# Patient Record
Sex: FEMALE | Race: WHITE | NOT HISPANIC OR LATINO | Employment: PART TIME | URBAN - METROPOLITAN AREA
[De-identification: names, ages, dates, MRNs, and addresses within clinical notes are randomized per-mention and may not be internally consistent; named-entity substitution may affect disease eponyms.]

---

## 2020-07-22 ENCOUNTER — HOSPITAL ENCOUNTER (EMERGENCY)
Facility: HOSPITAL | Age: 40
Discharge: HOME | End: 2020-07-22
Attending: EMERGENCY MEDICINE
Payer: MEDICAID

## 2020-07-22 VITALS
SYSTOLIC BLOOD PRESSURE: 135 MMHG | DIASTOLIC BLOOD PRESSURE: 72 MMHG | HEART RATE: 70 BPM | OXYGEN SATURATION: 100 % | HEIGHT: 64 IN | TEMPERATURE: 99 F | WEIGHT: 195 LBS | BODY MASS INDEX: 33.29 KG/M2 | RESPIRATION RATE: 18 BRPM

## 2020-07-22 DIAGNOSIS — R60.9 SWELLING: ICD-10-CM

## 2020-07-22 DIAGNOSIS — M79.89 LEG SWELLING: Primary | ICD-10-CM

## 2020-07-22 DIAGNOSIS — R60.0 EXTREMITY EDEMA: ICD-10-CM

## 2020-07-22 LAB
ALBUMIN SERPL BCP-MCNC: 3.6 G/DL (ref 3.5–5.2)
ALBUMIN SERPL BCP-MCNC: 3.8 G/DL (ref 3.5–5.2)
ALP SERPL-CCNC: 76 U/L (ref 55–135)
ALP SERPL-CCNC: 77 U/L (ref 55–135)
ALT SERPL W/O P-5'-P-CCNC: 97 U/L (ref 10–44)
ALT SERPL W/O P-5'-P-CCNC: 98 U/L (ref 10–44)
ANION GAP SERPL CALC-SCNC: 8 MMOL/L (ref 8–16)
AST SERPL-CCNC: 92 U/L (ref 10–40)
AST SERPL-CCNC: 96 U/L (ref 10–40)
B-HCG UR QL: NEGATIVE
BASOPHILS # BLD AUTO: 0.04 K/UL (ref 0–0.2)
BASOPHILS NFR BLD: 0.6 % (ref 0–1.9)
BILIRUB DIRECT SERPL-MCNC: 0.3 MG/DL (ref 0.1–0.3)
BILIRUB SERPL-MCNC: 0.7 MG/DL (ref 0.1–1)
BILIRUB SERPL-MCNC: 0.8 MG/DL (ref 0.1–1)
BILIRUB UR QL STRIP: NEGATIVE
BNP SERPL-MCNC: 166 PG/ML (ref 0–99)
BUN SERPL-MCNC: 16 MG/DL (ref 6–20)
CALCIUM SERPL-MCNC: 8.7 MG/DL (ref 8.7–10.5)
CHLORIDE SERPL-SCNC: 103 MMOL/L (ref 95–110)
CK SERPL-CCNC: 55 U/L (ref 20–180)
CLARITY UR: CLEAR
CO2 SERPL-SCNC: 27 MMOL/L (ref 23–29)
COLOR UR: YELLOW
CREAT SERPL-MCNC: 0.6 MG/DL (ref 0.5–1.4)
CTP QC/QA: YES
DIFFERENTIAL METHOD: ABNORMAL
EOSINOPHIL # BLD AUTO: 0.2 K/UL (ref 0–0.5)
EOSINOPHIL NFR BLD: 3.3 % (ref 0–8)
ERYTHROCYTE [DISTWIDTH] IN BLOOD BY AUTOMATED COUNT: 11.8 % (ref 11.5–14.5)
EST. GFR  (AFRICAN AMERICAN): >60 ML/MIN/1.73 M^2
EST. GFR  (NON AFRICAN AMERICAN): >60 ML/MIN/1.73 M^2
GLUCOSE SERPL-MCNC: 99 MG/DL (ref 70–110)
GLUCOSE UR QL STRIP: NEGATIVE
HCT VFR BLD AUTO: 45 % (ref 37–48.5)
HGB BLD-MCNC: 15.5 G/DL (ref 12–16)
HGB UR QL STRIP: NEGATIVE
IMM GRANULOCYTES # BLD AUTO: 0.01 K/UL (ref 0–0.04)
IMM GRANULOCYTES NFR BLD AUTO: 0.1 % (ref 0–0.5)
INR PPP: 1.1
KETONES UR QL STRIP: NEGATIVE
LEUKOCYTE ESTERASE UR QL STRIP: NEGATIVE
LYMPHOCYTES # BLD AUTO: 3.3 K/UL (ref 1–4.8)
LYMPHOCYTES NFR BLD: 49.8 % (ref 18–48)
MCH RBC QN AUTO: 33 PG (ref 27–31)
MCHC RBC AUTO-ENTMCNC: 34.4 G/DL (ref 32–36)
MCV RBC AUTO: 96 FL (ref 82–98)
MONOCYTES # BLD AUTO: 0.4 K/UL (ref 0.3–1)
MONOCYTES NFR BLD: 6.4 % (ref 4–15)
NEUTROPHILS # BLD AUTO: 2.7 K/UL (ref 1.8–7.7)
NEUTROPHILS NFR BLD: 39.8 % (ref 38–73)
NITRITE UR QL STRIP: NEGATIVE
NRBC BLD-RTO: 0 /100 WBC
PH UR STRIP: 6 [PH] (ref 5–8)
PLATELET # BLD AUTO: 130 K/UL (ref 150–350)
PMV BLD AUTO: 10.6 FL (ref 9.2–12.9)
POTASSIUM SERPL-SCNC: 3.9 MMOL/L (ref 3.5–5.1)
PROT SERPL-MCNC: 7.1 G/DL (ref 6–8.4)
PROT SERPL-MCNC: 7.4 G/DL (ref 6–8.4)
PROT UR QL STRIP: ABNORMAL
PROTHROMBIN TIME: 13.9 SEC (ref 10.6–14.8)
RBC # BLD AUTO: 4.69 M/UL (ref 4–5.4)
SODIUM SERPL-SCNC: 138 MMOL/L (ref 136–145)
SP GR UR STRIP: 1.03 (ref 1–1.03)
TSH SERPL DL<=0.005 MIU/L-ACNC: 1.84 UIU/ML (ref 0.34–5.6)
URN SPEC COLLECT METH UR: ABNORMAL
UROBILINOGEN UR STRIP-ACNC: ABNORMAL EU/DL
WBC # BLD AUTO: 6.67 K/UL (ref 3.9–12.7)

## 2020-07-22 PROCEDURE — 93005 ELECTROCARDIOGRAM TRACING: CPT | Performed by: INTERNAL MEDICINE

## 2020-07-22 PROCEDURE — 81025 URINE PREGNANCY TEST: CPT | Performed by: NURSE PRACTITIONER

## 2020-07-22 PROCEDURE — 36415 COLL VENOUS BLD VENIPUNCTURE: CPT

## 2020-07-22 PROCEDURE — 83880 ASSAY OF NATRIURETIC PEPTIDE: CPT

## 2020-07-22 PROCEDURE — 80076 HEPATIC FUNCTION PANEL: CPT

## 2020-07-22 PROCEDURE — 84443 ASSAY THYROID STIM HORMONE: CPT

## 2020-07-22 PROCEDURE — 85025 COMPLETE CBC W/AUTO DIFF WBC: CPT

## 2020-07-22 PROCEDURE — 81003 URINALYSIS AUTO W/O SCOPE: CPT

## 2020-07-22 PROCEDURE — 85610 PROTHROMBIN TIME: CPT

## 2020-07-22 PROCEDURE — 82550 ASSAY OF CK (CPK): CPT

## 2020-07-22 PROCEDURE — 25000003 PHARM REV CODE 250: Performed by: EMERGENCY MEDICINE

## 2020-07-22 PROCEDURE — 80053 COMPREHEN METABOLIC PANEL: CPT

## 2020-07-22 PROCEDURE — 99285 EMERGENCY DEPT VISIT HI MDM: CPT | Mod: 25

## 2020-07-22 RX ORDER — FUROSEMIDE 10 MG/ML
20 INJECTION INTRAMUSCULAR; INTRAVENOUS
Status: DISCONTINUED | OUTPATIENT
Start: 2020-07-22 | End: 2020-07-22

## 2020-07-22 RX ORDER — FUROSEMIDE 20 MG/1
20 TABLET ORAL
Status: COMPLETED | OUTPATIENT
Start: 2020-07-22 | End: 2020-07-22

## 2020-07-22 RX ADMIN — FUROSEMIDE 20 MG: 40 TABLET ORAL at 07:07

## 2020-07-22 NOTE — DISCHARGE INSTRUCTIONS
Keep appointment with your primary care physician as scheduled.  You can obtain results from your thyroid function test then.  You also need further evaluation of elevated liver function test.

## 2020-07-22 NOTE — ED PROVIDER NOTES
Encounter Date: 7/22/2020       History     Chief Complaint   Patient presents with    Leg Swelling     Bilateral leg swelling for 4 days, denies chest pain or shortness of breath     Patient recently went on a long car trip back and forth to [place].  Since that time she has been having bilateral lower extremity swelling.  She has no chest pain shortness of breath.  No similar swelling in the past.  Elevation only partially relieve symptoms.  No recent illness.  No fever or chills.        Review of patient's allergies indicates:  No Known Allergies  No past medical history on file.  Past Surgical History:   Procedure Laterality Date    ABDOMINAL SURGERY       No family history on file.  Social History     Tobacco Use    Smoking status: Never Smoker   Substance Use Topics    Alcohol use: No    Drug use: No     Review of Systems   Constitutional: Negative for chills and fever.   HENT: Negative for congestion.    Eyes: Negative for visual disturbance.   Respiratory: Negative for shortness of breath.    Cardiovascular: Negative for chest pain and palpitations.   Gastrointestinal: Negative for abdominal pain and vomiting.   Genitourinary: Negative for dysuria.   Musculoskeletal: Negative for joint swelling.   Neurological: Negative for headaches.   Psychiatric/Behavioral: Negative for confusion.       Physical Exam     Initial Vitals [07/22/20 1345]   BP Pulse Resp Temp SpO2   (!) 141/60 71 18 98.7 °F (37.1 °C) 100 %      MAP       --         Physical Exam    Nursing note and vitals reviewed.  Constitutional: She is not diaphoretic. No distress.   HENT:   Head: Normocephalic and atraumatic.   Eyes: Conjunctivae are normal.   Neck: Normal range of motion.   Cardiovascular: Normal rate.   Pulmonary/Chest: Breath sounds normal.   Abdominal: Soft. There is no abdominal tenderness.   Musculoskeletal: Normal range of motion.      Comments: There is 1+ bilateral pretibial edema.  Both legs with good dorsalis pedis  pulses.  Capillary refill less than 2 sec.  Full range of motion both legs with no pain.   Neurological: She is alert.   No gross deficits   Skin: No rash noted.   Psychiatric: She has a normal mood and affect.         ED Course   Procedures  Labs Reviewed   CBC W/ AUTO DIFFERENTIAL - Abnormal; Notable for the following components:       Result Value    Mean Corpuscular Hemoglobin 33.0 (*)     Platelets 130 (*)     Lymph% 49.8 (*)     All other components within normal limits   COMPREHENSIVE METABOLIC PANEL - Abnormal; Notable for the following components:    AST 92 (*)     ALT 97 (*)     All other components within normal limits   URINALYSIS - Abnormal; Notable for the following components:    Protein, UA Trace (*)     Urobilinogen, UA 2.0-3.0 (*)     All other components within normal limits   B-TYPE NATRIURETIC PEPTIDE - Abnormal; Notable for the following components:     (*)     All other components within normal limits   HEPATIC FUNCTION PANEL - Abnormal; Notable for the following components:    AST 96 (*)     ALT 98 (*)     All other components within normal limits   CK   HEPATIC FUNCTION PANEL   PROTIME-INR   POCT URINE PREGNANCY          Imaging Results          US Lower Extremity Veins Bilateral (In process)                X-Ray Chest PA And Lateral (Final result)  Result time 07/22/20 18:00:09    Final result by Richi Moraes MD (07/22/20 18:00:09)                 Narrative:    HISTORY: Dyspnea,  bilateral lower extremity swelling.    FINDINGS: PA and lateral chest radiograph at 1746 hours with no prior  studies for comparison show the trachea is midline, with the  cardiomediastinal silhouette and pulmonary vascular distribution  within normal limits.    The lungs are normally and symmetrically expanded, with no  consolidation, pleural effusion or evidence of pulmonary edema. No  confluent infiltrates or pneumothorax. There are no significant  osseous abnormalities.    IMPRESSION: No evidence of  active cardiopulmonary disease.    Electronically Signed by Richi KELLEY on 7/22/2020 6:07 PM                               Medical Decision Making:   History:   Old Medical Records: I decided to obtain old medical records.  Clinical Tests:   Lab Tests: Reviewed  Radiological Study: Reviewed  Medical Tests: Reviewed  ED Management:  Patient presents with lower extremity edema after their car trip.  No evidence of venous thrombosis.  Patient does have depended edema.  Will give 1 dose of Lasix.  Elevation stressed patient.  Patient has some minor elevation in AST and ALT.  She is to follow with her primary care doctor for further evaluation of liver function test hand to obtain thyroid testing results.                   ED Course as of Jul 22 1837   Wed Jul 22, 2020   1734 Here with c/o leg swelling denies SOB, bilateral leg pain , no fever     [MP]      ED Course User Index  [MP] ARSEN Smith                Clinical Impression:       ICD-10-CM ICD-9-CM   1. Leg swelling  M79.89 729.81   2. Swelling  R60.9 782.3   3. Extremity edema  R60.0 782.3                                Arpan Toro MD  07/22/20 9536

## 2021-02-25 ENCOUNTER — OFFICE VISIT (OUTPATIENT)
Dept: PODIATRY | Facility: CLINIC | Age: 41
End: 2021-02-25
Payer: MEDICAID

## 2021-02-25 VITALS — OXYGEN SATURATION: 97 % | BODY MASS INDEX: 33.47 KG/M2 | HEIGHT: 64 IN | HEART RATE: 94 BPM

## 2021-02-25 DIAGNOSIS — M79.675 PAIN IN TOES OF BOTH FEET: ICD-10-CM

## 2021-02-25 DIAGNOSIS — M79.674 PAIN IN TOES OF BOTH FEET: ICD-10-CM

## 2021-02-25 DIAGNOSIS — L60.0 INGROWN NAIL: Primary | ICD-10-CM

## 2021-02-25 PROCEDURE — 99203 OFFICE O/P NEW LOW 30 MIN: CPT | Mod: 25,S$GLB,, | Performed by: PODIATRIST

## 2021-02-25 PROCEDURE — 99203 PR OFFICE/OUTPT VISIT, NEW, LEVL III, 30-44 MIN: ICD-10-PCS | Mod: 25,S$GLB,, | Performed by: PODIATRIST

## 2021-02-25 PROCEDURE — 11750 EXCISION NAIL&NAIL MATRIX: CPT | Mod: T5,S$GLB,, | Performed by: PODIATRIST

## 2021-02-25 PROCEDURE — 11750 NAIL REMOVAL: ICD-10-PCS | Mod: 51,TA,S$GLB, | Performed by: PODIATRIST

## 2021-02-25 RX ORDER — BUPRENORPHINE HYDROCHLORIDE, NALOXONE HYDROCHLORIDE 8; 2 MG/1; MG/1
FILM, SOLUBLE BUCCAL; SUBLINGUAL DAILY
COMMUNITY
Start: 2021-02-03

## 2021-02-25 RX ORDER — CEPHALEXIN 500 MG/1
500 CAPSULE ORAL EVERY 12 HOURS
Qty: 10 CAPSULE | Refills: 0 | Status: SHIPPED | OUTPATIENT
Start: 2021-02-25 | End: 2021-03-02

## 2021-03-10 ENCOUNTER — OFFICE VISIT (OUTPATIENT)
Dept: PODIATRY | Facility: CLINIC | Age: 41
End: 2021-03-10
Payer: MEDICAID

## 2021-03-10 VITALS — HEIGHT: 64 IN | BODY MASS INDEX: 33.29 KG/M2 | WEIGHT: 195 LBS

## 2021-03-10 DIAGNOSIS — M79.674 PAIN IN TOES OF BOTH FEET: ICD-10-CM

## 2021-03-10 DIAGNOSIS — M79.675 PAIN IN TOES OF BOTH FEET: ICD-10-CM

## 2021-03-10 DIAGNOSIS — L60.0 INGROWN NAIL: Primary | ICD-10-CM

## 2021-03-10 PROCEDURE — 99213 PR OFFICE/OUTPT VISIT, EST, LEVL III, 20-29 MIN: ICD-10-PCS | Mod: S$GLB,,, | Performed by: PODIATRIST

## 2021-03-10 PROCEDURE — 99213 OFFICE O/P EST LOW 20 MIN: CPT | Mod: S$GLB,,, | Performed by: PODIATRIST

## 2021-06-13 ENCOUNTER — HOSPITAL ENCOUNTER (EMERGENCY)
Facility: HOSPITAL | Age: 41
Discharge: HOME | End: 2021-06-13
Attending: EMERGENCY MEDICINE
Payer: MEDICAID

## 2021-06-13 VITALS
HEIGHT: 64 IN | OXYGEN SATURATION: 99 % | BODY MASS INDEX: 34.15 KG/M2 | SYSTOLIC BLOOD PRESSURE: 142 MMHG | TEMPERATURE: 98 F | WEIGHT: 200 LBS | RESPIRATION RATE: 18 BRPM | DIASTOLIC BLOOD PRESSURE: 78 MMHG | HEART RATE: 90 BPM

## 2021-06-13 DIAGNOSIS — G89.18 POSTOPERATIVE PAIN: Primary | ICD-10-CM

## 2021-06-13 PROCEDURE — 99281 EMR DPT VST MAYX REQ PHY/QHP: CPT

## 2021-09-09 DIAGNOSIS — M72.2 PLANTAR FASCIAL FIBROMATOSIS: Primary | ICD-10-CM

## 2021-09-13 ENCOUNTER — HOSPITAL ENCOUNTER (OUTPATIENT)
Dept: RADIOLOGY | Facility: HOSPITAL | Age: 41
Discharge: HOME | End: 2021-09-13
Attending: STUDENT IN AN ORGANIZED HEALTH CARE EDUCATION/TRAINING PROGRAM
Payer: MEDICAID

## 2021-09-13 DIAGNOSIS — M72.2 PLANTAR FASCIAL FIBROMATOSIS: ICD-10-CM

## 2021-09-13 PROCEDURE — 73630 X-RAY EXAM OF FOOT: CPT | Mod: TC,50,PO

## 2021-09-16 ENCOUNTER — HOSPITAL ENCOUNTER (OUTPATIENT)
Dept: RADIOLOGY | Facility: HOSPITAL | Age: 41
Discharge: HOME | End: 2021-09-16
Attending: STUDENT IN AN ORGANIZED HEALTH CARE EDUCATION/TRAINING PROGRAM
Payer: MEDICAID

## 2021-09-16 DIAGNOSIS — R22.40 LOCALIZED SWELLING, MASS AND LUMP, LOWER LIMB: ICD-10-CM

## 2021-09-16 PROCEDURE — 93970 EXTREMITY STUDY: CPT | Mod: TC

## 2021-09-20 DIAGNOSIS — M77.31 BILATERAL CALCANEAL SPURS: Primary | ICD-10-CM

## 2021-09-20 DIAGNOSIS — M77.32 BILATERAL CALCANEAL SPURS: Primary | ICD-10-CM

## 2021-09-23 ENCOUNTER — CLINICAL SUPPORT (OUTPATIENT)
Dept: REHABILITATION | Facility: HOSPITAL | Age: 41
End: 2021-09-23
Payer: MEDICAID

## 2021-09-23 DIAGNOSIS — R29.898 DECREASED STRENGTH OF LOWER EXTREMITY: ICD-10-CM

## 2021-09-23 DIAGNOSIS — G89.29 CHRONIC FOOT PAIN, UNSPECIFIED LATERALITY: ICD-10-CM

## 2021-09-23 DIAGNOSIS — M25.672 DECREASED RANGE OF MOTION OF BOTH ANKLES: ICD-10-CM

## 2021-09-23 DIAGNOSIS — M25.671 DECREASED RANGE OF MOTION OF BOTH ANKLES: ICD-10-CM

## 2021-09-23 DIAGNOSIS — M79.673 CHRONIC FOOT PAIN, UNSPECIFIED LATERALITY: ICD-10-CM

## 2021-09-23 PROCEDURE — 97161 PT EVAL LOW COMPLEX 20 MIN: CPT | Mod: PN

## 2021-09-28 ENCOUNTER — CLINICAL SUPPORT (OUTPATIENT)
Dept: REHABILITATION | Facility: HOSPITAL | Age: 41
End: 2021-09-28
Payer: MEDICAID

## 2021-09-28 DIAGNOSIS — M79.673 CHRONIC FOOT PAIN, UNSPECIFIED LATERALITY: ICD-10-CM

## 2021-09-28 DIAGNOSIS — G89.29 CHRONIC FOOT PAIN, UNSPECIFIED LATERALITY: ICD-10-CM

## 2021-09-28 DIAGNOSIS — M25.671 DECREASED RANGE OF MOTION OF BOTH ANKLES: ICD-10-CM

## 2021-09-28 DIAGNOSIS — R29.898 DECREASED STRENGTH OF LOWER EXTREMITY: ICD-10-CM

## 2021-09-28 DIAGNOSIS — M25.672 DECREASED RANGE OF MOTION OF BOTH ANKLES: ICD-10-CM

## 2021-09-28 PROCEDURE — 97110 THERAPEUTIC EXERCISES: CPT | Mod: PN,CQ

## 2021-10-04 ENCOUNTER — CLINICAL SUPPORT (OUTPATIENT)
Dept: REHABILITATION | Facility: HOSPITAL | Age: 41
End: 2021-10-04
Payer: MEDICAID

## 2021-10-04 DIAGNOSIS — R29.898 DECREASED STRENGTH OF LOWER EXTREMITY: ICD-10-CM

## 2021-10-04 DIAGNOSIS — M79.673 CHRONIC FOOT PAIN, UNSPECIFIED LATERALITY: ICD-10-CM

## 2021-10-04 DIAGNOSIS — M25.672 DECREASED RANGE OF MOTION OF BOTH ANKLES: ICD-10-CM

## 2021-10-04 DIAGNOSIS — G89.29 CHRONIC FOOT PAIN, UNSPECIFIED LATERALITY: ICD-10-CM

## 2021-10-04 DIAGNOSIS — M25.671 DECREASED RANGE OF MOTION OF BOTH ANKLES: ICD-10-CM

## 2021-10-04 PROCEDURE — 97110 THERAPEUTIC EXERCISES: CPT | Mod: PN

## 2021-10-07 ENCOUNTER — DOCUMENTATION ONLY (OUTPATIENT)
Dept: REHABILITATION | Facility: HOSPITAL | Age: 41
End: 2021-10-07

## 2021-10-08 ENCOUNTER — CLINICAL SUPPORT (OUTPATIENT)
Dept: REHABILITATION | Facility: HOSPITAL | Age: 41
End: 2021-10-08
Payer: MEDICAID

## 2021-10-08 DIAGNOSIS — M79.673 CHRONIC FOOT PAIN, UNSPECIFIED LATERALITY: ICD-10-CM

## 2021-10-08 DIAGNOSIS — M25.671 DECREASED RANGE OF MOTION OF BOTH ANKLES: ICD-10-CM

## 2021-10-08 DIAGNOSIS — M25.672 DECREASED RANGE OF MOTION OF BOTH ANKLES: ICD-10-CM

## 2021-10-08 DIAGNOSIS — G89.29 CHRONIC FOOT PAIN, UNSPECIFIED LATERALITY: ICD-10-CM

## 2021-10-08 DIAGNOSIS — R29.898 DECREASED STRENGTH OF LOWER EXTREMITY: ICD-10-CM

## 2021-10-08 PROCEDURE — 97110 THERAPEUTIC EXERCISES: CPT | Mod: PN,CQ

## 2021-10-12 ENCOUNTER — CLINICAL SUPPORT (OUTPATIENT)
Dept: REHABILITATION | Facility: HOSPITAL | Age: 41
End: 2021-10-12
Payer: MEDICAID

## 2021-10-12 DIAGNOSIS — G89.29 CHRONIC FOOT PAIN, UNSPECIFIED LATERALITY: ICD-10-CM

## 2021-10-12 DIAGNOSIS — R29.898 DECREASED STRENGTH OF LOWER EXTREMITY: ICD-10-CM

## 2021-10-12 DIAGNOSIS — M25.672 DECREASED RANGE OF MOTION OF BOTH ANKLES: ICD-10-CM

## 2021-10-12 DIAGNOSIS — M79.673 CHRONIC FOOT PAIN, UNSPECIFIED LATERALITY: ICD-10-CM

## 2021-10-12 DIAGNOSIS — M25.671 DECREASED RANGE OF MOTION OF BOTH ANKLES: ICD-10-CM

## 2021-10-12 PROCEDURE — 97110 THERAPEUTIC EXERCISES: CPT | Mod: PN,CQ

## 2021-10-22 ENCOUNTER — CLINICAL SUPPORT (OUTPATIENT)
Dept: REHABILITATION | Facility: HOSPITAL | Age: 41
End: 2021-10-22
Payer: MEDICAID

## 2021-10-22 DIAGNOSIS — M25.672 DECREASED RANGE OF MOTION OF BOTH ANKLES: ICD-10-CM

## 2021-10-22 DIAGNOSIS — R29.898 DECREASED STRENGTH OF LOWER EXTREMITY: ICD-10-CM

## 2021-10-22 DIAGNOSIS — G89.29 CHRONIC FOOT PAIN, UNSPECIFIED LATERALITY: ICD-10-CM

## 2021-10-22 DIAGNOSIS — M25.671 DECREASED RANGE OF MOTION OF BOTH ANKLES: ICD-10-CM

## 2021-10-22 DIAGNOSIS — M79.673 CHRONIC FOOT PAIN, UNSPECIFIED LATERALITY: ICD-10-CM

## 2021-10-22 PROCEDURE — 97110 THERAPEUTIC EXERCISES: CPT | Mod: PN,CQ

## 2021-11-05 ENCOUNTER — CLINICAL SUPPORT (OUTPATIENT)
Dept: REHABILITATION | Facility: HOSPITAL | Age: 41
End: 2021-11-05
Payer: MEDICAID

## 2021-11-05 DIAGNOSIS — M25.671 DECREASED RANGE OF MOTION OF BOTH ANKLES: ICD-10-CM

## 2021-11-05 DIAGNOSIS — G89.29 CHRONIC FOOT PAIN, UNSPECIFIED LATERALITY: ICD-10-CM

## 2021-11-05 DIAGNOSIS — M79.673 CHRONIC FOOT PAIN, UNSPECIFIED LATERALITY: ICD-10-CM

## 2021-11-05 DIAGNOSIS — M25.672 DECREASED RANGE OF MOTION OF BOTH ANKLES: ICD-10-CM

## 2021-11-05 DIAGNOSIS — R29.898 DECREASED STRENGTH OF LOWER EXTREMITY: ICD-10-CM

## 2021-11-05 PROCEDURE — 97110 THERAPEUTIC EXERCISES: CPT | Mod: PN

## 2021-12-28 ENCOUNTER — HOSPITAL ENCOUNTER (EMERGENCY)
Facility: HOSPITAL | Age: 41
Discharge: HOME | End: 2021-12-28
Attending: EMERGENCY MEDICINE
Payer: MEDICAID

## 2021-12-28 VITALS
SYSTOLIC BLOOD PRESSURE: 137 MMHG | DIASTOLIC BLOOD PRESSURE: 69 MMHG | RESPIRATION RATE: 20 BRPM | HEIGHT: 64 IN | WEIGHT: 200 LBS | HEART RATE: 75 BPM | TEMPERATURE: 98 F | OXYGEN SATURATION: 99 % | BODY MASS INDEX: 34.15 KG/M2

## 2021-12-28 DIAGNOSIS — U07.1 COVID-19: Primary | ICD-10-CM

## 2021-12-28 LAB — SARS-COV-2 RDRP RESP QL NAA+PROBE: POSITIVE

## 2021-12-28 PROCEDURE — 99283 EMERGENCY DEPT VISIT LOW MDM: CPT

## 2021-12-28 PROCEDURE — U0002 COVID-19 LAB TEST NON-CDC: HCPCS | Performed by: EMERGENCY MEDICINE

## 2021-12-28 RX ORDER — IBUPROFEN 600 MG/1
600 TABLET ORAL EVERY 6 HOURS PRN
Qty: 20 TABLET | Refills: 0 | Status: SHIPPED | OUTPATIENT
Start: 2021-12-28

## 2021-12-28 NOTE — ED PROVIDER NOTES
"Tele Dispo Note    Video connection - adequate  Provider location - [place]  Patient location-  Emergency Department    HPI  Cassy Vance, a nontoxic/well appearing, 41 y.o. female, presented to the ED with c/o COVID-19 concerns. She reports nasal congestion, body aches, and ear pain. She denies chest pain, cough, or shortness of breath. Several coworkers have tested positive.  The patient was seen virtually to reduce the risk of COVID exposure. Pt did have the choice to see an in person provider but was agreeable to virtual visit.      PMH:No past medical history on file.  Surg History:   Past Surgical History:   Procedure Laterality Date    ABDOMINAL SURGERY        Social History: Reviewed  Allergies: Reviewed  Medications: Reviewed    The history is provided by the patient.    Nursing/Ancillary staff note reviewed.    ROS  Positive for: congestion, body aches, nausea, ear pain  Negative except as documented in history of present illness.      PE  Vitals:   Vitals:    12/28/21 1618   BP: 137/69   Pulse: 75   Resp: 20   Temp: 98 °F (36.7 °C)   TempSrc: Oral   SpO2: 99%   Weight: 90.7 kg (200 lb)   Height: 5' 4" (1.626 m)        Gen:   Well developed, well-nourished in NAD.  Awake, alert and oriented.   Nontoxic appearing  Speaking in full sentences  Well developed, hydrated and nourished.     Psych:   Normal behavior, normal affect.    HENT:   Normocephalic, atraumatic  Mucous membranes pink and moist  No hot potato voice  Oropharynx moist    Resp:   Normal respiratory effort  No retractions  No increased work of breathing  No audible wheezes  No signs of respiratory distress    CV:   Pt denies any chest pain    GI:   Pt denies any abdominal pain, vomiting or diarrhea    Neuro:  The patient is awake, alert and oriented to person, place, and time with normal speech.  Memory is normal and thought process is intact.   No gait abnormalities are appreciated.    Skin:   No observed " rashes/bruises.      DIFFERENTIAL DIAGNOSIS: After history and discussion with patient a differential diagnosis was considered, but was not limited to viral URI including influenza type illness, coronavirus, bronchitis, bacterial/viral pneumonia,  or reactive airway disease.      IMPRESSION:  The encounter diagnosis was COVID-19.       The patient did not have any signs of respiratory distress on video chat. The patient was able to speak in complete sentences without difficulty breathing. The patient was well appearing.     PLAN:   Patient positive for COVID-19. She will be treated with supportive care. Patient instructed to return to the ED with any new or worsening symptoms. She was instructed on CDC recommended guidelines regarding quarantine.           PATTI Campbell  12/28/21 1806

## 2021-12-28 NOTE — Clinical Note
"Cassy"Lorena Vance was seen and treated in our emergency department on 12/28/2021.     COVID-19 is present in our communities across the state. There is limited testing for COVID at this time, so not all patients can be tested. In this situation, your employee meets the following criteria:    Cassy aVnce has met the criteria for COVID-19 testing and has a POSITIVE result. She can return to work once they are asymptomatic for 72 hours without the use of fever reducing medications AND at least ten days from the first positive result.     If you have any questions or concerns, or if I can be of further assistance, please do not hesitate to contact me.    Sincerely,             Priscilla PATTI Carvajal"

## 2021-12-29 NOTE — DISCHARGE INSTRUCTIONS
Your COVID test in the ER today is positive.  You can rotate Tylenol and ibuprofen as needed for fever and body aches. Increase fluids and rest. Self isolate for the next 5 days.  Return to the ED if you have any increased chest pain, shortness of breath or difficulty breathing.    COVID-19 (Based on Centers for Disease Control and Prevention Recommendations)    Quarantine or isolation:    You quarantine when you might have been exposed to the virus.  You isolate when you have been infected with the virus, even if you don't have symptoms.    Quarantine if you have been in close contact (within 6 feet of someone for a cumulative total of 15 minutes or more over a 24-hour period) with someone who has COVID-19.    What to do:  Stay home for 14 days after your last contact with a person who has COVID-19.  Watch for fever (100.4?F), cough, shortness of breath, or other symptoms of COVID-19.  If possible, stay away from people you live with, especially people who are at higher risk for getting very sick from COVID-19.    You may be able to shorten your quarantine:  After day 7 after receiving a negative test result (test must occur on day 5 or later)    What to do if you are sick (COVID-19 Positive):  Stay home except to get medical care  Separate yourself from other people (An infected person can spread COVID-19 starting 48 hours (or 2 days) before the person has any symptoms or tests positive).   Monitor your symptoms (You may give age-appropriate dosage for acetaminophen or ibuprofen as directed by your healthcare provider for fever > 100.4)   If you are sick, wear a mask over your nose and mouth  Cover your coughs and sneezes  Clean your hands often    You can be around others after:  5 days since symptoms first appeared and  24 hours with no fever without the use of fever-reducing medications and  Other symptoms of COVID-19 are improving    When to seek emergency medication attention:   Has trouble breathing  Has  severe belly pain  Has pain or pressure in the chest  Is confused or not making sense  Is having trouble staying awake  Looks bluish in the lips or face      Our goal in the emergency department is to always give you outstanding care and exceptional service. You may receive a survey by mail or e-mail in the next week regarding your experience in our ED. We would greatly appreciate your completing and returning the survey. Your feedback provides us with a way to recognize our staff who give very good care and it helps us learn how to improve when your experience was below our aspiration of excellence.

## 2022-05-23 ENCOUNTER — HOSPITAL ENCOUNTER (EMERGENCY)
Facility: HOSPITAL | Age: 42
Discharge: LEFT BEFORE DISCHARGE | End: 2022-05-23
Attending: EMERGENCY MEDICINE
Payer: MEDICAID

## 2022-05-23 VITALS
WEIGHT: 200 LBS | TEMPERATURE: 98 F | DIASTOLIC BLOOD PRESSURE: 66 MMHG | SYSTOLIC BLOOD PRESSURE: 118 MMHG | HEIGHT: 64 IN | OXYGEN SATURATION: 100 % | BODY MASS INDEX: 34.15 KG/M2 | HEART RATE: 65 BPM | RESPIRATION RATE: 18 BRPM

## 2022-05-23 DIAGNOSIS — R42 DIZZINESS: Primary | ICD-10-CM

## 2022-05-23 DIAGNOSIS — R07.9 CHEST PAIN: ICD-10-CM

## 2022-05-23 LAB
ALBUMIN SERPL BCP-MCNC: 3.6 G/DL (ref 3.5–5.2)
ALP SERPL-CCNC: 73 U/L (ref 55–135)
ALT SERPL W/O P-5'-P-CCNC: 15 U/L (ref 10–44)
ANION GAP SERPL CALC-SCNC: 8 MMOL/L (ref 8–16)
AST SERPL-CCNC: 13 U/L (ref 10–40)
BASOPHILS # BLD AUTO: 0.05 K/UL (ref 0–0.2)
BASOPHILS NFR BLD: 0.4 % (ref 0–1.9)
BILIRUB SERPL-MCNC: 0.2 MG/DL (ref 0.1–1)
BNP SERPL-MCNC: 190 PG/ML (ref 0–99)
BUN SERPL-MCNC: 10 MG/DL (ref 6–20)
CALCIUM SERPL-MCNC: 9 MG/DL (ref 8.7–10.5)
CHLORIDE SERPL-SCNC: 105 MMOL/L (ref 95–110)
CO2 SERPL-SCNC: 27 MMOL/L (ref 23–29)
CREAT SERPL-MCNC: 0.7 MG/DL (ref 0.5–1.4)
DIFFERENTIAL METHOD: ABNORMAL
EOSINOPHIL # BLD AUTO: 0.1 K/UL (ref 0–0.5)
EOSINOPHIL NFR BLD: 0.7 % (ref 0–8)
ERYTHROCYTE [DISTWIDTH] IN BLOOD BY AUTOMATED COUNT: 11.9 % (ref 11.5–14.5)
EST. GFR  (AFRICAN AMERICAN): >60 ML/MIN/1.73 M^2
EST. GFR  (NON AFRICAN AMERICAN): >60 ML/MIN/1.73 M^2
GLUCOSE SERPL-MCNC: 87 MG/DL (ref 70–110)
HCT VFR BLD AUTO: 42.8 % (ref 37–48.5)
HGB BLD-MCNC: 14.8 G/DL (ref 12–16)
IMM GRANULOCYTES # BLD AUTO: 0.04 K/UL (ref 0–0.04)
IMM GRANULOCYTES NFR BLD AUTO: 0.3 % (ref 0–0.5)
LYMPHOCYTES # BLD AUTO: 2.9 K/UL (ref 1–4.8)
LYMPHOCYTES NFR BLD: 23.7 % (ref 18–48)
MCH RBC QN AUTO: 32 PG (ref 27–31)
MCHC RBC AUTO-ENTMCNC: 34.6 G/DL (ref 32–36)
MCV RBC AUTO: 92 FL (ref 82–98)
MONOCYTES # BLD AUTO: 0.6 K/UL (ref 0.3–1)
MONOCYTES NFR BLD: 4.8 % (ref 4–15)
NEUTROPHILS # BLD AUTO: 8.5 K/UL (ref 1.8–7.7)
NEUTROPHILS NFR BLD: 70.1 % (ref 38–73)
NRBC BLD-RTO: 0 /100 WBC
PLATELET # BLD AUTO: 194 K/UL (ref 150–450)
PMV BLD AUTO: 10.1 FL (ref 9.2–12.9)
POTASSIUM SERPL-SCNC: 3.9 MMOL/L (ref 3.5–5.1)
PROT SERPL-MCNC: 7.2 G/DL (ref 6–8.4)
RBC # BLD AUTO: 4.63 M/UL (ref 4–5.4)
SODIUM SERPL-SCNC: 140 MMOL/L (ref 136–145)
TROPONIN I SERPL DL<=0.01 NG/ML-MCNC: <0.006 NG/ML (ref 0–0.03)
WBC # BLD AUTO: 12.17 K/UL (ref 3.9–12.7)

## 2022-05-23 PROCEDURE — 99285 EMERGENCY DEPT VISIT HI MDM: CPT | Mod: 25

## 2022-05-23 PROCEDURE — 93010 ELECTROCARDIOGRAM REPORT: CPT | Mod: ,,, | Performed by: INTERNAL MEDICINE

## 2022-05-23 PROCEDURE — 63600175 PHARM REV CODE 636 W HCPCS: Performed by: EMERGENCY MEDICINE

## 2022-05-23 PROCEDURE — 96374 THER/PROPH/DIAG INJ IV PUSH: CPT

## 2022-05-23 PROCEDURE — 93005 ELECTROCARDIOGRAM TRACING: CPT

## 2022-05-23 PROCEDURE — 36415 COLL VENOUS BLD VENIPUNCTURE: CPT | Performed by: EMERGENCY MEDICINE

## 2022-05-23 PROCEDURE — 93010 EKG 12-LEAD: ICD-10-PCS | Mod: ,,, | Performed by: INTERNAL MEDICINE

## 2022-05-23 PROCEDURE — 85025 COMPLETE CBC W/AUTO DIFF WBC: CPT | Performed by: EMERGENCY MEDICINE

## 2022-05-23 PROCEDURE — 84484 ASSAY OF TROPONIN QUANT: CPT | Performed by: EMERGENCY MEDICINE

## 2022-05-23 PROCEDURE — 80053 COMPREHEN METABOLIC PANEL: CPT | Performed by: EMERGENCY MEDICINE

## 2022-05-23 PROCEDURE — 83880 ASSAY OF NATRIURETIC PEPTIDE: CPT | Performed by: EMERGENCY MEDICINE

## 2022-05-23 RX ORDER — ONDANSETRON 2 MG/ML
4 INJECTION INTRAMUSCULAR; INTRAVENOUS
Status: COMPLETED | OUTPATIENT
Start: 2022-05-23 | End: 2022-05-23

## 2022-05-23 RX ADMIN — ONDANSETRON 4 MG: 2 INJECTION INTRAMUSCULAR; INTRAVENOUS at 02:05

## 2022-05-23 NOTE — ED PROVIDER NOTES
"Encounter Date: 5/23/2022    SCRIBE #1 NOTE: I, Srinath Sepulveda, am scribing for, and in the presence of, Jerry Pablo MD.       History     Chief Complaint   Patient presents with    Dizziness     X 1 hour      Time seen by provider: 2:00 PM on 05/23/2022    Cassy Vance is a 41 y.o. female who presents to the ED with lightheadedness, nausea, and a "flushed" feeling. The Pt states that she was sitting at her desk at work when she felt a "flushed" feeling go from her chest to her face. She says she has since been lightheaded and nauseous, but states it has improved gradually since. She also c/o some left chest pain that has developed since and a cough. The patient denies vomiting, abdominal pain, diarrhea, hematuria, blood in stool, fever, congestion, headaches, or any other symptoms at this time. PMHx of hepatitis. PSHx of abdominal surgery. She states that she smokes less than a pack of cigarettes a day.      The history is provided by the patient.     Review of patient's allergies indicates:  No Known Allergies  No past medical history on file.  Past Surgical History:   Procedure Laterality Date    ABDOMINAL SURGERY       No family history on file.  Social History     Tobacco Use    Smoking status: Never Smoker   Substance Use Topics    Alcohol use: No    Drug use: No     Review of Systems   Constitutional: Negative for fever.   HENT: Negative for congestion and sore throat.    Respiratory: Positive for cough. Negative for shortness of breath.    Cardiovascular: Positive for chest pain.   Gastrointestinal: Negative for abdominal pain, blood in stool, diarrhea, nausea and vomiting.   Genitourinary: Negative for dysuria and hematuria.   Musculoskeletal: Negative for back pain.   Skin: Negative for rash.   Neurological: Positive for light-headedness. Negative for weakness and headaches.   Hematological: Does not bruise/bleed easily.       Physical Exam     Initial Vitals [05/23/22 1333]   BP Pulse " Resp Temp SpO2   130/70 80 18 97.6 °F (36.4 °C) 99 %      MAP       --         Physical Exam    Nursing note and vitals reviewed.  Constitutional: She appears well-developed and well-nourished. She is not diaphoretic. No distress.   HENT:   Head: Normocephalic and atraumatic.   Eyes: EOM are normal. Pupils are equal, round, and reactive to light.   Neck: Neck supple.   Normal range of motion.  Cardiovascular: Normal rate, regular rhythm, normal heart sounds and intact distal pulses. Exam reveals no gallop and no friction rub.    No murmur heard.  Pulmonary/Chest: Breath sounds normal. No respiratory distress. She has no wheezes. She has no rhonchi. She has no rales.   Abdominal: Abdomen is soft. Bowel sounds are normal. There is no abdominal tenderness. There is no rebound and no guarding.   Musculoskeletal:         General: Normal range of motion.      Cervical back: Normal range of motion and neck supple.     Neurological: She is alert and oriented to person, place, and time. She has normal strength. No cranial nerve deficit or sensory deficit. GCS score is 15. GCS eye subscore is 4. GCS verbal subscore is 5. GCS motor subscore is 6.   Skin: Skin is warm and dry.   Psychiatric: She has a normal mood and affect. Her behavior is normal. Judgment and thought content normal.         ED Course   Procedures  Labs Reviewed   CBC W/ AUTO DIFFERENTIAL - Abnormal; Notable for the following components:       Result Value    MCH 32.0 (*)     Gran # (ANC) 8.5 (*)     All other components within normal limits   B-TYPE NATRIURETIC PEPTIDE - Abnormal; Notable for the following components:     (*)     All other components within normal limits   COMPREHENSIVE METABOLIC PANEL   TROPONIN I     EKG Readings: (Independently Interpreted)   Initial Reading: No STEMI. Rhythm: Normal Sinus Rhythm. Heart Rate: 77. Ectopy: No Ectopy. Conduction: Normal. ST Segments: Normal ST Segments. T Waves: Normal. Clinical Impression: Normal  Sinus Rhythm       Imaging Results          X-Ray Chest AP Portable (Final result)  Result time 05/23/22 15:54:37    Final result by Guillaume Card Jr., MD (05/23/22 15:54:37)                 Impression:      Increased interstitial density at both lung bases may represent interstitial fibrosis or mild pneumonitis.      Electronically signed by: Guillaume Card MD  Date:    05/23/2022  Time:    15:54             Narrative:    EXAMINATION:  XR CHEST AP PORTABLE    CLINICAL HISTORY:  Chest Pain;    TECHNIQUE:  Single frontal view of the chest was performed.    COMPARISON:  Chest of July 22, 2020    FINDINGS:  The mediastinal and cardiac size and contours are normal.  The diaphragms of pleura clear.  There is increased interstitial markings at the lung bases which may represent a mild interstitial fibrosis or pneumonitis.  Consolidation or solid mass is not seen.  No pneumothorax or pleural effusion is noted.                                 Medications   ondansetron injection 4 mg (4 mg Intravenous Given 5/23/22 1452)     Medical Decision Making:   History:   Old Medical Records: I decided to obtain old medical records.  Initial Assessment:   41-year-old female presented with multiple complaints.  Differential Diagnosis:   Initial differential diagnosis included but not limited to atypical MI, dehydration, and cardiac arrhythmia.  Independently Interpreted Test(s):   I have ordered and independently interpreted EKG Reading(s) - see prior notes  Clinical Tests:   Lab Tests: Ordered and Reviewed  Radiological Study: Ordered and Reviewed  Medical Tests: Ordered and Reviewed  ED Management:  The patient was emergently evaluated in the emergency, her evaluation was significant for a middle-age female with a normal neurologic exam.  The patient's EKG showed no acute abnormalities per my independent interpretation.  The patient's chest x-ray showed no acute abnormalities per Radiology.  The patient's labs showed no acute  processes, negative troponin.  The patient did wish to wait for a repeat troponin and signed out against medical advice.  The patient showed no signs of clinical intoxication and capacity making ability to make her own decisions.  She was encouraged to follow primary care for further workup and treatment as an outpatient.  She is also encouraged to return and complete her workup at any time.          Scribe Attestation:   Scribe #1: I performed the above scribed service and the documentation accurately describes the services I performed. I attest to the accuracy of the note.              I, Dr. Jerry Pablo, personally performed the services described in this documentation. All medical record entries made by the scribe were at my direction and in my presence.  I have reviewed the chart and agree that the record reflects my personal performance and is accurate and complete. Jerry Pablo MD.  10:27 PM 05/23/2022      Clinical Impression:   Final diagnoses:  [R07.9] Chest pain  [R42] Dizziness (Primary)          ED Disposition Condition    AMA               Jerry Pablo MD  05/23/22 2229       Jerry Pablo MD  05/23/22 2230

## 2022-05-23 NOTE — LETTER
Patient: Cassy Vance  YOB: 1980  Date: 5/23/2022 Time: 4:37 PM  Location: [facility]    Leaving the Hospital Against Medical Advice    Chart #:[id number]    This will certify that I, the undersigned,    ______________________________________________________________________    A patient in the above named medical center, having requested discharge and removal from the medical center against the advice of my attending physician(s), hereby release [facility], its physicians, officers and employees, severally and individually, from any and all liability of any nature whatsoever for any injury or harm or complication of any kind that may result directly or indirectly, by reason of my terminating my stay as a patient at [facility] and my departure from [facility], and hereby waive any and all rights of action I may now have or later acquire as a result of my voluntary departure from [facility] and the termination of my stay as a patient therein.    This release is made with the full knowledge of the danger that may result from the action which I am taking.      Date:_______________________                         ___________________________                                                                                    Patient/Legal Representative    Witness:        ____________________________                          ___________________________  Nurse                                                                        Physician

## 2022-05-26 ENCOUNTER — PATIENT OUTREACH (OUTPATIENT)
Dept: EMERGENCY MEDICINE | Facility: HOSPITAL | Age: 42
End: 2022-05-26
Payer: MEDICAID

## 2022-05-26 NOTE — PROGRESS NOTES
Stephani Ye  ED Navigator  Emergency Department    Project: [facility] ED Navigator  Role: Community Health Worker    Date: 05/26/2022  Patient Name: Cassy Vacne  MRN: [id number]  PCP: Primary Doctor No    Assessment:     Cassy Vance is a 41 y.o. female who has presented to ED for dizziness. Patient has visited the ED 1 times in the past 3 months. Patient did not contact PCP.     ED Navigator Initial Assessment    ED Navigator Enrollment Documentation  Consent to Services  Does patient consent to completing the assessment?: Yes  Contact  Method of Initial Contact: Phone  Transportation  Does the patient have issues with Transportation?: No  Does the patient have transportation to and from healthcare appointments?: Yes  Insurance Coverage  Do you have coverage/adequate coverage?: Yes  Type/kind of coverage: Medicaid/Aetna  Is patient able to afford co-pays/deductibles?: Yes  Is patient able to afford HME or supplies?: Yes  Does patient have an established [facility] PCP?: Yes  Able to access?: Yes  Does the patient have a lack of adequate coverage?: No  Specialist Appointment  Did the patient come to the ED to see a specialist?: No  Does the patient have a pending specialist referral?: No  Does the patient have a specialist appointment made?: No  PCP Follow Up Appointment  Has the patient had an appointment with a primary care provider in the past year?: Yes  Provider: [facility]  Does the patient have a follow up appontment with a PCP?: Yes  Upcoming appointment date: 6/8/22  Provider: [facility]  Medications  Is patient able to afford medication?: Yes  Is patient unable to get medication due to lack of transportation?: No  Psychological  Does the patient have psycho-social concerns?: No  Food  Does the patient have concerns about food?: No  Communication/Education  Does the patient have limited English proficiency/English not primary  language?: No  Does patient have low literacy and/or low health literacy?: Yes  Does patient have concerns with care?: No  Does patient have dissatisfaction with care?: No  Other Financial Concerns  Does the patient have immediate financial distress?: No  Does the patient have general financial concerns?: No  Other Social Barriers/Concerns  Does the patient have any additional barriers or concerns?: None  Primary Barrier  Barriers identified: Patient identified no barriers to care  Root Cause of ED Utilization: Patient Knowledge/Low Health Literacy  Plan to address Patient Knowledge/Low Health Literacy: Provided information for [facility] 24/7 Nurse triage line [phone] or 1-[facility] ([phone])  Next steps: Provided Education  Was education/educational materials provided surrounding PCP services/creating a medical home?: Yes Was education verbal or written?: Written   Was education/educational materials provided surrounding low cost, healthy foods?: Yes Was education verbal or written?: Written   Was education/educational materials provided surrounding other items? If so, use comment to explain.: No    Plan: Provided information for [facility] Nurse triage line, [phone] or [facility] ([phone])  Expected Date of Follow Up 1: 6/23/22         Social History     Socioeconomic History    Marital status:    Tobacco Use    Smoking status: Never Smoker   Substance and Sexual Activity    Alcohol use: No    Drug use: No     Social Determinants of Health     Financial Resource Strain: Low Risk     Difficulty of Paying Living Expenses: Not hard at all   Food Insecurity: No Food Insecurity    Worried About Running Out of Food in the Last Year: Never true    Ran Out of Food in the Last Year: Never true   Transportation Needs: No Transportation Needs    Lack of Transportation (Medical): No    Lack of Transportation (Non-Medical): No   Stress: No Stress Concern Present     Feeling of Stress : Not at all   Social Connections: Unknown    Frequency of Communication with Friends and Family: More than three times a week    Frequency of Social Gatherings with Friends and Family: More than three times a week    Marital Status:    Housing Stability: Unknown    Unable to Pay for Housing in the Last Year: No    Unstable Housing in the Last Year: No       Plan:   ED Navigator spoke with patient on the telephone and completed initial assessment.  Patient denied any concerns with food, transportation, housing, or utilities.  Patient reported having a PCP and has a follow up appointment on June 8th.  Patient has all other providers in place.  Patient is not a smoker.  The following resources were emailed to patient:  [facility] brochure, [facility] Nurse Triage line, information on virtual medicine, and Eating Healthy tip sheet.      Stephani Ye  ED Navigator

## 2022-09-14 ENCOUNTER — HOSPITAL ENCOUNTER (EMERGENCY)
Facility: HOSPITAL | Age: 42
Discharge: HOME | End: 2022-09-14
Attending: EMERGENCY MEDICINE
Payer: MEDICAID

## 2022-09-14 VITALS
DIASTOLIC BLOOD PRESSURE: 80 MMHG | RESPIRATION RATE: 18 BRPM | BODY MASS INDEX: 34.15 KG/M2 | HEIGHT: 64 IN | HEART RATE: 66 BPM | OXYGEN SATURATION: 95 % | SYSTOLIC BLOOD PRESSURE: 145 MMHG | TEMPERATURE: 98 F | WEIGHT: 200 LBS

## 2022-09-14 DIAGNOSIS — R11.0 NAUSEA: ICD-10-CM

## 2022-09-14 LAB
ALBUMIN SERPL BCP-MCNC: 3.6 G/DL (ref 3.5–5.2)
ALP SERPL-CCNC: 70 U/L (ref 55–135)
ALT SERPL W/O P-5'-P-CCNC: 14 U/L (ref 10–44)
ANION GAP SERPL CALC-SCNC: 7 MMOL/L (ref 8–16)
AST SERPL-CCNC: 18 U/L (ref 10–40)
BACTERIA #/AREA URNS HPF: ABNORMAL /HPF
BASOPHILS # BLD AUTO: 0.03 K/UL (ref 0–0.2)
BASOPHILS NFR BLD: 0.5 % (ref 0–1.9)
BILIRUB SERPL-MCNC: 0.2 MG/DL (ref 0.1–1)
BILIRUB UR QL STRIP: NEGATIVE
BUN SERPL-MCNC: 13 MG/DL (ref 6–20)
CALCIUM SERPL-MCNC: 9.1 MG/DL (ref 8.7–10.5)
CHLORIDE SERPL-SCNC: 105 MMOL/L (ref 95–110)
CLARITY UR: ABNORMAL
CO2 SERPL-SCNC: 27 MMOL/L (ref 23–29)
COLOR UR: YELLOW
CREAT SERPL-MCNC: 0.8 MG/DL (ref 0.5–1.4)
DIFFERENTIAL METHOD: ABNORMAL
EOSINOPHIL # BLD AUTO: 0.2 K/UL (ref 0–0.5)
EOSINOPHIL NFR BLD: 2.8 % (ref 0–8)
ERYTHROCYTE [DISTWIDTH] IN BLOOD BY AUTOMATED COUNT: 12.1 % (ref 11.5–14.5)
EST. GFR  (NO RACE VARIABLE): >60 ML/MIN/1.73 M^2
GLUCOSE SERPL-MCNC: 107 MG/DL (ref 70–110)
GLUCOSE UR QL STRIP: NEGATIVE
HCG INTACT+B SERPL-ACNC: <1.2 MIU/ML
HCT VFR BLD AUTO: 43.4 % (ref 37–48.5)
HGB BLD-MCNC: 15 G/DL (ref 12–16)
HGB UR QL STRIP: ABNORMAL
IMM GRANULOCYTES # BLD AUTO: 0.01 K/UL (ref 0–0.04)
IMM GRANULOCYTES NFR BLD AUTO: 0.2 % (ref 0–0.5)
KETONES UR QL STRIP: NEGATIVE
LEUKOCYTE ESTERASE UR QL STRIP: NEGATIVE
LIPASE SERPL-CCNC: 17 U/L (ref 4–60)
LYMPHOCYTES # BLD AUTO: 2.4 K/UL (ref 1–4.8)
LYMPHOCYTES NFR BLD: 37 % (ref 18–48)
MAGNESIUM SERPL-MCNC: 2 MG/DL (ref 1.6–2.6)
MCH RBC QN AUTO: 31.8 PG (ref 27–31)
MCHC RBC AUTO-ENTMCNC: 34.6 G/DL (ref 32–36)
MCV RBC AUTO: 92 FL (ref 82–98)
MICROSCOPIC COMMENT: ABNORMAL
MONOCYTES # BLD AUTO: 0.4 K/UL (ref 0.3–1)
MONOCYTES NFR BLD: 6.6 % (ref 4–15)
NEUTROPHILS # BLD AUTO: 3.4 K/UL (ref 1.8–7.7)
NEUTROPHILS NFR BLD: 52.9 % (ref 38–73)
NITRITE UR QL STRIP: NEGATIVE
NRBC BLD-RTO: 0 /100 WBC
PH UR STRIP: 6 [PH] (ref 5–8)
PLATELET # BLD AUTO: 178 K/UL (ref 150–450)
PMV BLD AUTO: 10 FL (ref 9.2–12.9)
POTASSIUM SERPL-SCNC: 4.5 MMOL/L (ref 3.5–5.1)
PROT SERPL-MCNC: 6.8 G/DL (ref 6–8.4)
PROT UR QL STRIP: NEGATIVE
RBC # BLD AUTO: 4.72 M/UL (ref 4–5.4)
RBC #/AREA URNS HPF: 6 /HPF (ref 0–4)
SODIUM SERPL-SCNC: 139 MMOL/L (ref 136–145)
SP GR UR STRIP: 1.02 (ref 1–1.03)
SQUAMOUS #/AREA URNS HPF: 35 /HPF
TROPONIN I SERPL DL<=0.01 NG/ML-MCNC: <0.006 NG/ML (ref 0–0.03)
URN SPEC COLLECT METH UR: ABNORMAL
UROBILINOGEN UR STRIP-ACNC: NEGATIVE EU/DL
WBC # BLD AUTO: 6.4 K/UL (ref 3.9–12.7)
WBC #/AREA URNS HPF: 3 /HPF (ref 0–5)

## 2022-09-14 PROCEDURE — 99284 EMERGENCY DEPT VISIT MOD MDM: CPT

## 2022-09-14 PROCEDURE — 93010 ELECTROCARDIOGRAM REPORT: CPT | Mod: ,,, | Performed by: INTERNAL MEDICINE

## 2022-09-14 PROCEDURE — 93005 ELECTROCARDIOGRAM TRACING: CPT

## 2022-09-14 PROCEDURE — 83690 ASSAY OF LIPASE: CPT | Performed by: EMERGENCY MEDICINE

## 2022-09-14 PROCEDURE — 85025 COMPLETE CBC W/AUTO DIFF WBC: CPT | Performed by: EMERGENCY MEDICINE

## 2022-09-14 PROCEDURE — 93010 EKG 12-LEAD: ICD-10-PCS | Mod: ,,, | Performed by: INTERNAL MEDICINE

## 2022-09-14 PROCEDURE — 84702 CHORIONIC GONADOTROPIN TEST: CPT | Performed by: EMERGENCY MEDICINE

## 2022-09-14 PROCEDURE — 84484 ASSAY OF TROPONIN QUANT: CPT | Performed by: EMERGENCY MEDICINE

## 2022-09-14 PROCEDURE — 36415 COLL VENOUS BLD VENIPUNCTURE: CPT | Performed by: EMERGENCY MEDICINE

## 2022-09-14 PROCEDURE — 80053 COMPREHEN METABOLIC PANEL: CPT | Performed by: EMERGENCY MEDICINE

## 2022-09-14 PROCEDURE — 83735 ASSAY OF MAGNESIUM: CPT | Performed by: EMERGENCY MEDICINE

## 2022-09-14 PROCEDURE — 81000 URINALYSIS NONAUTO W/SCOPE: CPT | Performed by: EMERGENCY MEDICINE

## 2022-09-14 NOTE — ED PROVIDER NOTES
"Encounter Date: 9/14/2022    SCRIBE #1 NOTE: I, Marcelle Velazco, am scribing for, and in the presence of,  Brando Godoy MD.     History     Chief Complaint   Patient presents with    Nausea     Nausea and dizziness started 45 minutes PTA while working as a  at [employer].  Pt stated "it keeps coming in waves"     Time seen by provider: 12:12 PM on 09/14/2022    Cassy Vance is a 42 y.o. female who presents to the ED with nausea, diaphoresis, and dizziness that began 45 minutes ago. Pt reports that she was normal when she woke up and the symptoms began while she was at work. Pt endorses that all symptoms have resolved in the ED. Pt thinks it may have been anxiety, but she has no Hx of anxiety. Pt sees her PCP in 12 days. Pt denies daily medications. The patient denies abdominal pain, SOB, lightheadedness, dysuria, or any other symptoms at this time. PMHx of non alcoholic cirrhosis of the liver. PSHx of abdominal surgery.     The history is provided by the patient.   Review of patient's allergies indicates:  No Known Allergies  No past medical history on file.  Past Surgical History:   Procedure Laterality Date    ABDOMINAL SURGERY       No family history on file.  Social History     Tobacco Use    Smoking status: Never   Substance Use Topics    Alcohol use: No    Drug use: No     Review of Systems   Constitutional:  Positive for diaphoresis.   Respiratory:  Negative for shortness of breath.    Gastrointestinal:  Positive for nausea. Negative for abdominal pain.   Genitourinary:  Negative for dysuria.   Neurological:  Positive for dizziness. Negative for light-headedness.   All other systems reviewed and are negative.    Physical Exam     Initial Vitals [09/14/22 1149]   BP Pulse Resp Temp SpO2   (!) 151/82 81 18 98.2 °F (36.8 °C) 98 %      MAP       --         Physical Exam    Constitutional: No distress.   HENT:   Head: Normocephalic.   Nose: Nose normal.   Eyes: Right eye exhibits no discharge. Left eye " exhibits no discharge.   Cardiovascular:  Normal rate.           Pulmonary/Chest: Breath sounds normal. No stridor. No respiratory distress.   Abdominal: She exhibits no distension. There is no abdominal tenderness.     Neurological: She is alert.   Skin: Skin is warm and dry.   Psychiatric: She has a normal mood and affect.       ED Course   Procedures  Labs Reviewed   CBC W/ AUTO DIFFERENTIAL - Abnormal; Notable for the following components:       Result Value    MCH 31.8 (*)     All other components within normal limits    Narrative:     Release to patient->Immediate   COMPREHENSIVE METABOLIC PANEL   LIPASE   MAGNESIUM   TROPONIN I   URINALYSIS   HCG, QUANTITATIVE     EKG Readings: (Independently Interpreted)   Sinus rhythm, Q-waves in anterior leads, no other acute ST changes     Imaging Results    None          Medications - No data to display  Medical Decision Making:   History:   Old Medical Records: I decided to obtain old medical records.  Initial Assessment:   A/P:  Low suspicion for ACS, below testing threshold for PE/dissection.  No abdominal tenderness, normal vital signs, EKG nonischemic.  Patient asymptomatic at this time.  Labs within normal limits, patient given strict immediate return precautions for worsening symptoms, will follow-up as outpatient with her PMD  Independently Interpreted Test(s):   I have ordered and independently interpreted EKG Reading(s) - see prior notes  Clinical Tests:   Lab Tests: Ordered and Reviewed  Medical Tests: Reviewed and Ordered        Scribe Attestation:   Scribe #1: I performed the above scribed service and the documentation accurately describes the services I performed. I attest to the accuracy of the note.          I, Dr. Brando Godoy, personally performed the services described in this documentation. All medical record entries made by the scribe were at my direction and in my presence.  I have reviewed the chart and agree that the record reflects my personal  performance and is accurate and complete. Brando Godoy MD.  3:28 PM 09/14/2022           Clinical Impression:   Final diagnoses:  [R11.0] Nausea             Brando Godoy MD  09/14/22 3666

## 2022-09-15 ENCOUNTER — PATIENT OUTREACH (OUTPATIENT)
Dept: EMERGENCY MEDICINE | Facility: HOSPITAL | Age: 42
End: 2022-09-15
Payer: MEDICAID

## 2023-04-11 DIAGNOSIS — Z12.31 ENCOUNTER FOR SCREENING MAMMOGRAM FOR MALIGNANT NEOPLASM OF BREAST: Primary | ICD-10-CM

## 2023-04-21 ENCOUNTER — HOSPITAL ENCOUNTER (OUTPATIENT)
Dept: RADIOLOGY | Facility: HOSPITAL | Age: 43
Discharge: HOME | End: 2023-04-21
Attending: FAMILY MEDICINE
Payer: MEDICAID

## 2023-04-21 VITALS — HEIGHT: 64 IN | WEIGHT: 206.38 LBS | BODY MASS INDEX: 35.23 KG/M2

## 2023-04-21 DIAGNOSIS — Z12.31 ENCOUNTER FOR SCREENING MAMMOGRAM FOR MALIGNANT NEOPLASM OF BREAST: ICD-10-CM

## 2023-04-21 PROCEDURE — 77067 SCR MAMMO BI INCL CAD: CPT | Mod: TC,PO

## 2023-05-05 ENCOUNTER — HOSPITAL ENCOUNTER (OUTPATIENT)
Dept: RADIOLOGY | Facility: HOSPITAL | Age: 43
Discharge: HOME | End: 2023-05-05
Attending: FAMILY MEDICINE
Payer: MEDICAID

## 2023-05-05 DIAGNOSIS — R92.2 INCONCLUSIVE MAMMOGRAPHY: ICD-10-CM

## 2023-05-05 PROCEDURE — 76642 ULTRASOUND BREAST LIMITED: CPT | Mod: TC,50,PO

## 2024-05-10 ENCOUNTER — HOSPITAL ENCOUNTER (OUTPATIENT)
Dept: RADIOLOGY | Facility: HOSPITAL | Age: 44
Discharge: HOME | End: 2024-05-10
Payer: MEDICAID

## 2024-05-10 DIAGNOSIS — M79.671 RIGHT FOOT PAIN: Primary | ICD-10-CM

## 2024-05-10 DIAGNOSIS — M25.571 PAIN IN RIGHT ANKLE AND JOINTS OF RIGHT FOOT: ICD-10-CM

## 2024-05-10 DIAGNOSIS — M79.671 RIGHT FOOT PAIN: ICD-10-CM

## 2024-05-10 DIAGNOSIS — M25.571 RIGHT ANKLE PAIN: ICD-10-CM

## 2024-05-10 PROCEDURE — 73610 X-RAY EXAM OF ANKLE: CPT | Mod: TC,RT

## 2024-05-10 PROCEDURE — 73630 X-RAY EXAM OF FOOT: CPT | Mod: TC,RT

## 2024-05-10 PROCEDURE — 73630 X-RAY EXAM OF FOOT: CPT | Mod: 26,RT,, | Performed by: RADIOLOGY

## 2024-05-10 PROCEDURE — 73610 X-RAY EXAM OF ANKLE: CPT | Mod: 26,RT,, | Performed by: RADIOLOGY

## 2024-07-26 DIAGNOSIS — S92.011D CLOSED DISPLACED FRACTURE OF BODY OF RIGHT CALCANEUS WITH ROUTINE HEALING, SUBSEQUENT ENCOUNTER: Primary | ICD-10-CM

## 2024-08-01 ENCOUNTER — CLINICAL SUPPORT (OUTPATIENT)
Dept: REHABILITATION | Facility: HOSPITAL | Age: 44
End: 2024-08-01
Payer: MEDICAID

## 2024-08-01 DIAGNOSIS — M25.671 IMPAIRED RANGE OF MOTION OF RIGHT ANKLE: ICD-10-CM

## 2024-08-01 DIAGNOSIS — Z74.09 IMPAIRED FUNCTIONAL MOBILITY AND ACTIVITY TOLERANCE: ICD-10-CM

## 2024-08-01 DIAGNOSIS — S92.011S: Primary | ICD-10-CM

## 2024-08-01 PROCEDURE — 97110 THERAPEUTIC EXERCISES: CPT | Mod: PN

## 2024-08-01 PROCEDURE — 97161 PT EVAL LOW COMPLEX 20 MIN: CPT | Mod: PN
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
## 2024-08-03 PROBLEM — M25.671 IMPAIRED RANGE OF MOTION OF RIGHT ANKLE: Status: ACTIVE | Noted: 2024-08-03

## 2024-08-03 PROBLEM — Z74.09 IMPAIRED FUNCTIONAL MOBILITY AND ACTIVITY TOLERANCE: Status: ACTIVE | Noted: 2024-08-03

## 2024-08-05 ENCOUNTER — CLINICAL SUPPORT (OUTPATIENT)
Dept: REHABILITATION | Facility: HOSPITAL | Age: 44
End: 2024-08-05
Payer: MEDICAID

## 2024-08-05 DIAGNOSIS — Z74.09 IMPAIRED FUNCTIONAL MOBILITY AND ACTIVITY TOLERANCE: ICD-10-CM

## 2024-08-05 DIAGNOSIS — M25.671 IMPAIRED RANGE OF MOTION OF RIGHT ANKLE: Primary | ICD-10-CM

## 2024-08-05 PROCEDURE — 97110 THERAPEUTIC EXERCISES: CPT | Mod: PN

## 2024-08-12 ENCOUNTER — CLINICAL SUPPORT (OUTPATIENT)
Dept: REHABILITATION | Facility: HOSPITAL | Age: 44
End: 2024-08-12
Payer: MEDICAID

## 2024-08-12 DIAGNOSIS — M25.671 IMPAIRED RANGE OF MOTION OF RIGHT ANKLE: Primary | ICD-10-CM

## 2024-08-12 DIAGNOSIS — Z74.09 IMPAIRED FUNCTIONAL MOBILITY AND ACTIVITY TOLERANCE: ICD-10-CM

## 2024-08-12 PROCEDURE — 97110 THERAPEUTIC EXERCISES: CPT | Mod: PN,CQ

## 2024-08-12 NOTE — PROGRESS NOTES
[facility]  Outpatient Physical Therapy Daily Treatment Note      Name: Cassy Vance  Clinic Number: [id number]  Visit Date: 8/12/2024    Therapy Diagnosis:   Encounter Diagnoses   Name Primary?    Impaired range of motion of right ankle Yes    Impaired functional mobility and activity tolerance        Physician: Selena Peters, NP  Physician Orders: PT Eval and Treat   Medical Diagnosis from Referral: S92.011D (ICD-10-CM) - Closed displaced fracture of body of right calcaneus with routine healing, subsequent   Evaluation Date: 8/1/2024  Authorization Period Expiration: 8/2/25  Plan of Care Expiration: 10/15/24  Visit # / Visits authorized: 3/ 12   (POC 3/12)   FOTO Due visit 5  FOTO Due visit 10     Precautions: Weightbearing 50% in boot     PTA Visit #: 1/5     FOTO Due Visit 5 -  Follow up  FOTO Due Visit 10 - Follow up    Time In: 105  Time Out: 158  Total Billable Timed: 53 minutes  Total Billable Un-timed: 0 minutes    Precautions: Weightbearing 50% in boot     Subjective     The patient presents to therapy: just her heal hurts.   Response to previous treatment: no issues stated   Functional change: none     Pain: 0/10  Location: right ankles     Objective       Cassy received therapeutic exercises to develop strength, endurance, ROM, flexibility, posture, and core stabilization for 53 minutes including:    Nu step x 10 min. Level 2   Ankle 4 way Red TheraBand x 30   Straight leg raise x 30   Side lying hip abduction x 30  Bridges from bolster x 30   Side clams Green TheraBand x 20 each   Seated DorsiFlexion stretch x 30   Wobble board Eversion/Inversion x 30   Wobble board ClockWise /Counter Clockwise x 30   Shuttle bilateral 50# x 30   Shuttle single leg 25# x 30   +Precor Hip Abduction 60# x 30   + Precor Hip ADD 50# x 30   +Precor Knee flexion 40# x 30   + Precor Knee extension 30# x 30       Patient Education and HEP     She was compliant with home exercise  program.    Education provided:   - do hep, slowly increase WB in boot with Rolling Walker     Written Home Exercises Provided: Patient instructed to cont prior HEP.  Exercises were reviewed and Cassy was able to demonstrate them prior to the end of the session.  Cassy demonstrated fair  understanding of the education provided.     See EMR under Patient Instructions for exercises provided prior visit.    Assessment     The patient tolerated session well. Due to patients last MD note, gait training with Rolling Walker in boot performed to slowly increase Wbing tolerance and improve overall healing and functional mobility. Noted tightness in right ankle. Progress ankle mobility.     Pt will continue to benefit from skilled outpatient physical therapy to address the deficits listed in the problem list box on initial evaluation, provide pt/family education and to maximize pt's level of independence in the home and community environment.     Cassy Is progressing well towards her goals.   Pt prognosis is Good.     Pt's spiritual, cultural and educational needs considered and pt agreeable to plan of care and goals.    Anticipated barriers to physical therapy: none    Goals:   SHORT TERM GOALS:  3 weeks  Progress Date met    The patient will begin a written HEP [] Met  [] Not Met  [x] Progressing      Increase ankle dorsiflexion to 3 degrees [] Met  [] Not Met  [x] Progressing     Decrease soft tissue tenderness to 2/10 [] Met  [] Not Met  [x] Progressing                      LONG TERM GOALS: 6 weeks  Progress Date met    The patient will be independent with his HEP for maintenance [] Met  [] Not Met  [x] Progressing     Increase ankle dorsiflexion to 8 degrees [] Met  [] Not Met  [x] Progressing     Decrease FOTO score to 44 % [] Met  [] Not Met  [x] Progressing     Decrease soft tissue tenderness to 0/10 [] Met  [] Not Met  [x] Progressing     Walk without AD and return to work  [] Met  [x] Not Met        Plan      Continue with the plan of care established per initial evaluation    Anna Obrien, SAVI

## 2024-08-15 ENCOUNTER — CLINICAL SUPPORT (OUTPATIENT)
Dept: REHABILITATION | Facility: HOSPITAL | Age: 44
End: 2024-08-15
Payer: MEDICAID

## 2024-08-15 DIAGNOSIS — Z74.09 IMPAIRED FUNCTIONAL MOBILITY AND ACTIVITY TOLERANCE: ICD-10-CM

## 2024-08-15 DIAGNOSIS — M25.671 IMPAIRED RANGE OF MOTION OF RIGHT ANKLE: Primary | ICD-10-CM

## 2024-08-15 PROCEDURE — 97110 THERAPEUTIC EXERCISES: CPT | Mod: PN,CQ

## 2024-08-15 NOTE — PROGRESS NOTES
[facility]  Outpatient Physical Therapy Daily Treatment Note      Name: Cassy Vance  Clinic Number: [id number]  Visit Date: 8/15/2024    Therapy Diagnosis:   Encounter Diagnoses   Name Primary?    Impaired range of motion of right ankle Yes    Impaired functional mobility and activity tolerance        Physician: Selena Peters, NP  Physician Orders: PT Eval and Treat   Medical Diagnosis from Referral: S92.011D (ICD-10-CM) - Closed displaced fracture of body of right calcaneus with routine healing, subsequent   Evaluation Date: 8/1/2024  Authorization Period Expiration: 8/2/25  Plan of Care Expiration: 10/15/24  Visit # / Visits authorized: 4/ 12   (POC 4/12)   FOTO Due visit 5  FOTO Due visit 10     Precautions: Weightbearing 50% in boot     PTA Visit #: 2/5     FOTO Due Visit 5 -  Follow up  FOTO Due Visit 10 - Follow up    Time In: 105  Time Out: 158  Total Billable Timed: 53 minutes  Total Billable Un-timed: 0 minutes  Physical Therapy technician assisted with treatment under direct supervision of treating therapist.    Precautions: Weightbearing 50% in boot     Subjective     The patient presents to therapy: with no reports of pain. Just complaints of generalized fatigue from walking at last treatment session since she hasn't walked in 4 months.     Response to previous treatment: no issues stated   Functional change: none     Pain: 0/10  Location: right ankles     Objective       Cassy received therapeutic exercises to develop strength, endurance, ROM, flexibility, posture, and core stabilization for 53 minutes including:    Nu step x 10 min. Level 2   Ankle 4 way Red TheraBand x 30   Straight leg raise x 30   Side lying hip abduction x 30  Bridges from bolster x 30   Side clams Green TheraBand x 20 each   Seated DorsiFlexion stretch x 30   Wobble board Eversion/Inversion x 30   Wobble board ClockWise /Counter Clockwise x 30   Shuttle bilateral 50# x 30   Shuttle single leg  25# x 30   +Precor Hip Abduction 60# x 30   + Precor Hip ADD 50# x 30   +Precor Knee flexion 40# x 30   + Precor Knee extension 30# x 30       Patient Education and HEP     She was compliant with home exercise program.    Education provided:   - do hep, slowly increase WB in boot with Rolling Walker     Written Home Exercises Provided: Patient instructed to cont prior HEP.  Exercises were reviewed and Cassy was able to demonstrate them prior to the end of the session.  Cassy demonstrated fair  understanding of the education provided.     See EMR under Patient Instructions for exercises provided prior visit.    Assessment     Cassy noted with good tolerance to treatment. Continued focus on improving range of motion and strength gains as able to increase tolerance to weight bearing. Will progress as able.     Pt will continue to benefit from skilled outpatient physical therapy to address the deficits listed in the problem list box on initial evaluation, provide pt/family education and to maximize pt's level of independence in the home and community environment.     Cassy Is progressing well towards her goals.   Pt prognosis is Good.     Pt's spiritual, cultural and educational needs considered and pt agreeable to plan of care and goals.    Anticipated barriers to physical therapy: none    Goals:   SHORT TERM GOALS:  3 weeks  Progress Date met    The patient will begin a written HEP [] Met  [] Not Met  [x] Progressing      Increase ankle dorsiflexion to 3 degrees [] Met  [] Not Met  [x] Progressing     Decrease soft tissue tenderness to 2/10 [] Met  [] Not Met  [x] Progressing                      LONG TERM GOALS: 6 weeks  Progress Date met    The patient will be independent with his HEP for maintenance [] Met  [] Not Met  [x] Progressing     Increase ankle dorsiflexion to 8 degrees [] Met  [] Not Met  [x] Progressing     Decrease FOTO score to 44 % [] Met  [] Not Met  [x] Progressing     Decrease soft  tissue tenderness to 0/10 [] Met  [] Not Met  [x] Progressing     Walk without AD and return to work  [] Met  [x] Not Met        Plan     Continue with the plan of care established per initial evaluation    Lynn Sandoval PTA

## 2024-08-19 ENCOUNTER — CLINICAL SUPPORT (OUTPATIENT)
Dept: REHABILITATION | Facility: HOSPITAL | Age: 44
End: 2024-08-19
Payer: MEDICAID

## 2024-08-19 DIAGNOSIS — M25.671 IMPAIRED RANGE OF MOTION OF RIGHT ANKLE: Primary | ICD-10-CM

## 2024-08-19 DIAGNOSIS — Z74.09 IMPAIRED FUNCTIONAL MOBILITY AND ACTIVITY TOLERANCE: ICD-10-CM

## 2024-08-19 PROCEDURE — 97110 THERAPEUTIC EXERCISES: CPT | Mod: PN,CQ
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
## 2024-08-20 ENCOUNTER — DOCUMENTATION ONLY (OUTPATIENT)
Dept: REHABILITATION | Facility: HOSPITAL | Age: 44
End: 2024-08-20
Payer: MEDICAID

## 2024-08-21 ENCOUNTER — DOCUMENTATION ONLY (OUTPATIENT)
Dept: REHABILITATION | Facility: HOSPITAL | Age: 44
End: 2024-08-21
Payer: MEDICAID

## 2024-08-21 NOTE — PROGRESS NOTES
PT/PTA met face to face to discuss pt's treatment plan and progress towards established goals. Pt will be seen by a physical therapist minimally every 6th visit or every 30 days.      Anna Obrien PTA

## 2024-08-22 ENCOUNTER — CLINICAL SUPPORT (OUTPATIENT)
Dept: REHABILITATION | Facility: HOSPITAL | Age: 44
End: 2024-08-22
Payer: MEDICAID

## 2024-08-22 DIAGNOSIS — M25.671 IMPAIRED RANGE OF MOTION OF RIGHT ANKLE: Primary | ICD-10-CM

## 2024-08-22 DIAGNOSIS — Z74.09 IMPAIRED FUNCTIONAL MOBILITY AND ACTIVITY TOLERANCE: ICD-10-CM

## 2024-08-22 PROCEDURE — 97110 THERAPEUTIC EXERCISES: CPT | Mod: KX,PN,CQ

## 2024-08-22 NOTE — PROGRESS NOTES
[facility]  Outpatient Physical Therapy Daily Treatment Note      Name: Cassy Vance  Clinic Number: [id number]  Visit Date: 8/22/2024    Therapy Diagnosis:   Encounter Diagnoses   Name Primary?    Impaired range of motion of right ankle Yes    Impaired functional mobility and activity tolerance        Physician: Selena Peters, NP  Physician Orders: PT Eval and Treat   Medical Diagnosis from Referral: S92.011D (ICD-10-CM) - Closed displaced fracture of body of right calcaneus with routine healing, subsequent   Evaluation Date: 8/1/2024  Authorization Period Expiration: 8/2/25  Plan of Care Expiration: 10/15/24  Visit # / Visits authorized: 6/ 12   (POC 6/12)   FOTO Due visit 5  FOTO Due visit 10     Precautions: Weightbearing 50% in boot     FOTO Due Visit 5 -  Follow up  FOTO Due Visit 10 - Follow up    Time In: 100p  Time Out: 154p  Total Billable Timed: 54 minutes  Total Billable Un-timed: 0 minutes    Precautions:   From 8/20/24 Office Visit:   I had a long discussion with regard to examination and diagnostic findings along with a treatment and/or diagnostic workup plan. The patient can be weightbearing as tolerated and progress out of the cam boot. She will follow-up in 8 weeks for repeat evaluation     Subjective     The patient presents to therapy: she has a baby shower this weekend and she will need to go up<>down steps  Response to previous treatment: good   Functional change: using Rolling Walker for short distance     Pain: 0/10  Location: right ankles     Objective       Cassy received therapeutic exercises to develop strength, endurance, ROM, flexibility, posture, and core stabilization for 54 minutes including:    Nu step x 10 min. Level 1        Shuttle bilateral 50# x 30   Shuttle single leg 25# x 30     Precor Hip Abduction 70# x 30   Precor Hip ADD 60# x 30   Precor Knee flexion 40# x 30   Precor Knee extension 30# x 30     Ambulation with RW, working on  increasing weight bearing through Right Lower Extremity    Posterior ankle mobs  Passive inver/ever stretching    NOT PERFORMED   Ankle 4 way Red TheraBand x 30   Straight leg raise 2# x 30   Side lying hip abduction 2# x 30  Bridges from bolster x 30   Side clams Green TheraBand x 20 each   Seated DorsiFlexion stretch x 30   Wobble board Eversion/Inversion x 30   Wobble board ClockWise /Counter Clockwise x 30   DorsiFlexion Heel slides x 30     Patient Education and HEP     She was compliant with home exercise program.    Education provided:   - do hep, slowly increase WB in boot with Rolling Walker     Written Home Exercises Provided: Patient instructed to cont prior HEP.  Exercises were reviewed and Cassy was able to demonstrate them prior to the end of the session.  Cassy demonstrated fair  understanding of the education provided.     See EMR under Patient Instructions for exercises provided prior visit.    Assessment     Cassy was able to ambulate with improved stance tie on Right Lower Extremity after Verbal cues and repetitions.  She reported she feels comfortable going up<>down steps.  Instructed pt to bring shoe with her next visit.    Pt will continue to benefit from skilled outpatient physical therapy to address the deficits listed in the problem list box on initial evaluation, provide pt/family education and to maximize pt's level of independence in the home and community environment.     Cassy Is progressing well towards her goals.   Pt prognosis is Good.     Pt's spiritual, cultural and educational needs considered and pt agreeable to plan of care and goals.    Anticipated barriers to physical therapy: none    Goals:   SHORT TERM GOALS:  3 weeks  Progress Date met    The patient will begin a written HEP [] Met  [] Not Met  [x] Progressing      Increase ankle dorsiflexion to 3 degrees [] Met  [] Not Met  [x] Progressing     Decrease soft tissue tenderness to 2/10 [] Met  [] Not Met  [x]  Progressing                      LONG TERM GOALS: 6 weeks  Progress Date met    The patient will be independent with his HEP for maintenance [] Met  [] Not Met  [x] Progressing     Increase ankle dorsiflexion to 8 degrees [] Met  [] Not Met  [x] Progressing     Decrease FOTO score to 44 % [] Met  [] Not Met  [x] Progressing     Decrease soft tissue tenderness to 0/10 [] Met  [] Not Met  [x] Progressing     Walk without AD and return to work  [] Met  [x] Not Met        Plan     Continue with the plan of care established per initial evaluation    Emeli Durán, PTA

## 2024-08-26 ENCOUNTER — CLINICAL SUPPORT (OUTPATIENT)
Dept: REHABILITATION | Facility: HOSPITAL | Age: 44
End: 2024-08-26
Payer: MEDICAID

## 2024-08-26 ENCOUNTER — DOCUMENTATION ONLY (OUTPATIENT)
Dept: REHABILITATION | Facility: HOSPITAL | Age: 44
End: 2024-08-26
Payer: MEDICAID

## 2024-08-26 DIAGNOSIS — M25.671 IMPAIRED RANGE OF MOTION OF RIGHT ANKLE: Primary | ICD-10-CM

## 2024-08-26 DIAGNOSIS — Z74.09 IMPAIRED FUNCTIONAL MOBILITY AND ACTIVITY TOLERANCE: ICD-10-CM

## 2024-08-26 PROCEDURE — 97110 THERAPEUTIC EXERCISES: CPT | Mod: PN

## 2024-08-26 NOTE — PROGRESS NOTES
[facility]  Outpatient Physical Therapy Daily Treatment Note      Name: Cassy Vance  Clinic Number: [id number]  Visit Date: 8/26/2024    Therapy Diagnosis:   Encounter Diagnoses   Name Primary?    Impaired range of motion of right ankle Yes    Impaired functional mobility and activity tolerance        Physician: Selena Peters, NP  Physician Orders: PT Eval and Treat   Medical Diagnosis from Referral: S92.011D (ICD-10-CM) - Closed displaced fracture of body of right calcaneus with routine healing, subsequent   Evaluation Date: 8/1/2024  Authorization Period Expiration: 8/2/25  Plan of Care Expiration: 10/15/24  Visit # / Visits authorized: 7/ 12   (POC 7/12)   FOTO Due visit 5  FOTO Due visit 10     Precautions: Weightbearing 50% in boot     FOTO Due Visit 5 -  Follow up  FOTO Due Visit 10 - Follow up    Time In: 100p  Time Out: 154p  Total Billable Timed: 54 minutes  Total Billable Un-timed: 0 minutes    Precautions:   From 8/20/24 Office Visit:   I had a long discussion with regard to examination and diagnostic findings along with a treatment and/or diagnostic workup plan. The patient can be weightbearing as tolerated and progress out of the cam boot. She will follow-up in 8 weeks for repeat evaluation     Subjective     The patient presents to therapy: she was on her foot a lot Saturday and was sore Sunday   Response to previous treatment: good   Functional change: using Rolling Walker for short distance     Pain: 0/10  Location: right ankles     Objective       Cassy received therapeutic exercises to develop strength, endurance, ROM, flexibility, posture, and core stabilization for 54 minutes including:    Nu step x 10 min. Level 1    Shuttle bilateral 50# x 30   Shuttle single leg 25# x 30     Precor Hip Abduction 70# x 30   Precor Hip ADD 60# x 30   Precor Knee flexion 40# x 30   Precor Knee extension 30# x 30     Ambulation with RW, working on increasing weight bearing  through Right Lower Extremity    Posterior ankle mobs  Passive inver/ever stretching    NOT PERFORMED   Ankle 4 way Red TheraBand x 30   Straight leg raise 2# x 30   Side lying hip abduction 2# x 30  Bridges from bolster x 30   Side clams Green TheraBand x 20 each   Seated DorsiFlexion stretch x 30   Wobble board Eversion/Inversion x 30   Wobble board ClockWise /Counter Clockwise x 30   DorsiFlexion Heel slides x 30     Patient Education and HEP     She was compliant with home exercise program.    Education provided:   - do hep, slowly increase WB in boot with Rolling Walker     Written Home Exercises Provided: Patient instructed to cont prior HEP.  Exercises were reviewed and Cassy was able to demonstrate them prior to the end of the session.  Cassy demonstrated fair  understanding of the education provided.     See EMR under Patient Instructions for exercises provided prior visit.    Assessment     Cassy is progressing with her Lower Extremity strength, still lacking ankle motion especially inv/ev.  Continue with her Plan of Care     Pt will continue to benefit from skilled outpatient physical therapy to address the deficits listed in the problem list box on initial evaluation, provide pt/family education and to maximize pt's level of independence in the home and community environment.     Cassy Is progressing well towards her goals.   Pt prognosis is Good.     Pt's spiritual, cultural and educational needs considered and pt agreeable to plan of care and goals.    Anticipated barriers to physical therapy: none    Goals:   SHORT TERM GOALS:  3 weeks  Progress Date met    The patient will begin a written HEP [] Met  [] Not Met  [x] Progressing      Increase ankle dorsiflexion to 3 degrees [] Met  [] Not Met  [x] Progressing     Decrease soft tissue tenderness to 2/10 [] Met  [] Not Met  [x] Progressing                      LONG TERM GOALS: 6 weeks  Progress Date met    The patient will be independent  with his HEP for maintenance [] Met  [] Not Met  [x] Progressing     Increase ankle dorsiflexion to 8 degrees [] Met  [] Not Met  [x] Progressing     Decrease FOTO score to 44 % [] Met  [] Not Met  [x] Progressing     Decrease soft tissue tenderness to 0/10 [] Met  [] Not Met  [x] Progressing     Walk without AD and return to work  [] Met  [x] Not Met        Plan     Continue with the plan of care established per initial evaluation    Guillaume Acosta, PT

## 2024-08-26 NOTE — PROGRESS NOTES
PT/PTA met face to face to discuss pt's treatment plan and progress towards established goals. Pt will be seen by a physical therapist minimally every 6th visit or every 30 days.    Emeli Durán PTA

## 2024-08-29 ENCOUNTER — CLINICAL SUPPORT (OUTPATIENT)
Dept: REHABILITATION | Facility: HOSPITAL | Age: 44
End: 2024-08-29
Payer: MEDICAID

## 2024-08-29 DIAGNOSIS — M25.671 IMPAIRED RANGE OF MOTION OF RIGHT ANKLE: Primary | ICD-10-CM

## 2024-08-29 DIAGNOSIS — Z74.09 IMPAIRED FUNCTIONAL MOBILITY AND ACTIVITY TOLERANCE: ICD-10-CM

## 2024-08-29 PROCEDURE — 97110 THERAPEUTIC EXERCISES: CPT | Mod: PN

## 2024-08-29 NOTE — PROGRESS NOTES
[facility]  Outpatient Physical Therapy Daily Treatment Note      Name: Cassy Vance  Clinic Number: [id number]  Visit Date: 8/29/2024    Therapy Diagnosis:   Encounter Diagnoses   Name Primary?    Impaired range of motion of right ankle Yes    Impaired functional mobility and activity tolerance        Physician: Selena Peters, NP  Physician Orders: PT Eval and Treat   Medical Diagnosis from Referral: S92.011D (ICD-10-CM) - Closed displaced fracture of body of right calcaneus with routine healing, subsequent   Evaluation Date: 8/1/2024  Authorization Period Expiration: 8/2/25  Plan of Care Expiration: 10/15/24  Visit # / Visits authorized: 8/ 12   (POC 8/12)   FOTO Due visit 5  FOTO Due visit 10     Precautions: Weightbearing 100% can wean boot     FOTO Due Visit 5 -  Follow up  FOTO Due Visit 10 - Follow up    Time In: 100p  Time Out: 154p  Total Billable Timed: 54 minutes  Total Billable Un-timed: 0 minutes    Precautions:   From 8/20/24 Office Visit:   I had a long discussion with regard to examination and diagnostic findings along with a treatment and/or diagnostic workup plan. The patient can be weightbearing as tolerated and progress out of the cam boot. She will follow-up in 8 weeks for repeat evaluation     Subjective     The patient presents to therapy: she was on her foot a lot Saturday and was sore Sunday   Response to previous treatment: good   Functional change: using Rolling Walker for short distance     Pain: 0/10  Location: right ankles     Objective       Cassy received therapeutic exercises to develop strength, endurance, ROM, flexibility, posture, and core stabilization for 54 minutes including:    Nu step x 10 min. Level 2.5    Shuttle bilateral 62# x 30   Shuttle single leg 37# x 30     Precor Hip Abduction 70# x 30   Precor Hip ADD 60# x 30   Precor Knee flexion 40# x 30   Precor Knee extension 30# x 30     Ambulation with RW, working on increasing weight  bearing through Right Lower Extremity    Posterior ankle mobs  Passive inver/ever stretching    NOT PERFORMED   Ankle 4 way Red TheraBand x 30   Straight leg raise 2# x 30   Side lying hip abduction 2# x 30  Bridges from bolster x 30   Side clams Green TheraBand x 20 each   Seated DorsiFlexion stretch x 30   Wobble board Eversion/Inversion x 30   Wobble board ClockWise /Counter Clockwise x 30   DorsiFlexion Heel slides x 30     Patient Education and HEP     She was compliant with home exercise program.    Education provided:   - do hep, slowly try to wean from boot    Written Home Exercises Provided: Patient instructed to cont prior HEP.  Exercises were reviewed and Cassy was able to demonstrate them prior to the end of the session.  Cassy demonstrated fair  understanding of the education provided.     See EMR under Patient Instructions for exercises provided prior visit.    Assessment     Cassy is having difficulty weaning from walker and weight bearing, gets sharp pain in her heel when bears weight, balance with boot and no Rolling Walker is poor.  Continue with her Plan of Care work on getting off walker and increasing her weight bearing to her Lower Extremity      Pt will continue to benefit from skilled outpatient physical therapy to address the deficits listed in the problem list box on initial evaluation, provide pt/family education and to maximize pt's level of independence in the home and community environment.     Cassy Is progressing well towards her goals.   Pt prognosis is Good.     Pt's spiritual, cultural and educational needs considered and pt agreeable to plan of care and goals.    Anticipated barriers to physical therapy: none    Goals:   SHORT TERM GOALS:  3 weeks  Progress Date met    The patient will begin a written HEP [] Met  [] Not Met  [x] Progressing      Increase ankle dorsiflexion to 3 degrees [] Met  [] Not Met  [x] Progressing     Decrease soft tissue tenderness to 2/10 []  Met  [] Not Met  [x] Progressing                      LONG TERM GOALS: 6 weeks  Progress Date met    The patient will be independent with his HEP for maintenance [] Met  [] Not Met  [x] Progressing     Increase ankle dorsiflexion to 8 degrees [] Met  [] Not Met  [x] Progressing     Decrease FOTO score to 44 % [] Met  [] Not Met  [x] Progressing     Decrease soft tissue tenderness to 0/10 [] Met  [] Not Met  [x] Progressing     Walk without AD and return to work  [] Met  [x] Not Met        Plan     Continue with the plan of care established per initial evaluation    Guillaume Acosta, PT

## 2024-09-03 ENCOUNTER — CLINICAL SUPPORT (OUTPATIENT)
Dept: REHABILITATION | Facility: HOSPITAL | Age: 44
End: 2024-09-03
Payer: MEDICAID

## 2024-09-03 DIAGNOSIS — Z74.09 IMPAIRED FUNCTIONAL MOBILITY AND ACTIVITY TOLERANCE: ICD-10-CM

## 2024-09-03 DIAGNOSIS — M25.671 IMPAIRED RANGE OF MOTION OF RIGHT ANKLE: Primary | ICD-10-CM

## 2024-09-03 PROCEDURE — 97110 THERAPEUTIC EXERCISES: CPT | Mod: PN,CQ

## 2024-09-03 NOTE — PROGRESS NOTES
[facility]  Outpatient Physical Therapy Daily Treatment Note      Name: Cassy Vance  Clinic Number: [id number]  Visit Date: 9/3/2024    Therapy Diagnosis:   Encounter Diagnoses   Name Primary?    Impaired range of motion of right ankle Yes    Impaired functional mobility and activity tolerance        Physician: Selena Peters, NP  Physician Orders: PT Eval and Treat   Medical Diagnosis from Referral: S92.011D (ICD-10-CM) - Closed displaced fracture of body of right calcaneus with routine healing, subsequent   Evaluation Date: 8/1/2024  Authorization Period Expiration: 8/2/25  Plan of Care Expiration: 10/15/24  Visit # / Visits authorized: 9/ 12   (POC 9/12)   FOTO Due visit 5  FOTO Due visit 10     Precautions: Weightbearing 100% can wean boot     FOTO Due Visit 5 -  Follow up  FOTO Due Visit 10 - Follow up    Time In: 100p  Time Out: 154p  Total Billable Timed: 54 minutes  Total Billable Un-timed: 0 minutes    Precautions:   From 8/20/24 Office Visit:   I had a long discussion with regard to examination and diagnostic findings along with a treatment and/or diagnostic workup plan. The patient can be weightbearing as tolerated and progress out of the cam boot. She will follow-up in 8 weeks for repeat evaluation     Subjective     The patient presents to therapy: doing good, not ready for the shoe yet, but doing as much as she can.   Response to previous treatment: good   Functional change: using Rolling Walker for short distance     Pain: 0/10  Location: right ankles     Objective       Cassy received therapeutic exercises to develop strength, endurance, ROM, flexibility, posture, and core stabilization for 40 minutes including:    Alter G: XXL  50% x 6 min   45% heel/toe raises   45% Right Lower Extremity stance     Shuttle bilateral 62# x 30   Shuttle single leg 37# x 30     Precor Hip Abduction 70# x 30   Precor Hip ADD 60# x 30   Precor Knee flexion 40# x 30   Precor Knee  extension 30# x 30       NOT PERFORMED   Ankle 4 way Red TheraBand x 30   Straight leg raise 2# x 30   Side lying hip abduction 2# x 30  Bridges from bolster x 30   Side clams Green TheraBand x 20 each   Seated DorsiFlexion stretch x 30   Wobble board Eversion/Inversion x 30   Wobble board ClockWise /Counter Clockwise x 30   DorsiFlexion Heel slides x 30     Patient Education and HEP     She was compliant with home exercise program.    Education provided:   - do hep, slowly try to wean from boot    Written Home Exercises Provided: Patient instructed to cont prior HEP.  Exercises were reviewed and Cassy was able to demonstrate them prior to the end of the session.  Cassy demonstrated fair  understanding of the education provided.     See EMR under Patient Instructions for exercises provided prior visit.    Assessment     Antigravity treadmill used during session today to increase tolerance on Right Lower Extremity and improve range of motion along with overall mobility. Challenged appropriately, encouraged to look into using a cane and possibly a smaller ankle supportive brace as able. Progress as tolerated.     Pt will continue to benefit from skilled outpatient physical therapy to address the deficits listed in the problem list box on initial evaluation, provide pt/family education and to maximize pt's level of independence in the home and community environment.     Cassy Is progressing well towards her goals.   Pt prognosis is Good.     Pt's spiritual, cultural and educational needs considered and pt agreeable to plan of care and goals.    Anticipated barriers to physical therapy: none    Goals:   SHORT TERM GOALS:  3 weeks  Progress Date met    The patient will begin a written HEP [] Met  [] Not Met  [x] Progressing      Increase ankle dorsiflexion to 3 degrees [] Met  [] Not Met  [x] Progressing     Decrease soft tissue tenderness to 2/10 [] Met  [] Not Met  [x] Progressing                      LONG  TERM GOALS: 6 weeks  Progress Date met    The patient will be independent with his HEP for maintenance [] Met  [] Not Met  [x] Progressing     Increase ankle dorsiflexion to 8 degrees [] Met  [] Not Met  [x] Progressing     Decrease FOTO score to 44 % [] Met  [] Not Met  [x] Progressing     Decrease soft tissue tenderness to 0/10 [] Met  [] Not Met  [x] Progressing     Walk without AD and return to work  [] Met  [x] Not Met        Plan     Continue with the plan of care established per initial evaluation    Anna Obrien, PTA

## 2024-09-09 ENCOUNTER — CLINICAL SUPPORT (OUTPATIENT)
Dept: REHABILITATION | Facility: HOSPITAL | Age: 44
End: 2024-09-09
Payer: MEDICAID

## 2024-09-09 ENCOUNTER — HOSPITAL ENCOUNTER (OUTPATIENT)
Dept: RADIOLOGY | Facility: HOSPITAL | Age: 44
Discharge: HOME | End: 2024-09-09
Payer: MEDICAID

## 2024-09-09 DIAGNOSIS — Z74.09 IMPAIRED FUNCTIONAL MOBILITY AND ACTIVITY TOLERANCE: ICD-10-CM

## 2024-09-09 DIAGNOSIS — M25.571 PAIN IN RIGHT ANKLE AND JOINTS OF RIGHT FOOT: ICD-10-CM

## 2024-09-09 DIAGNOSIS — M25.671 IMPAIRED RANGE OF MOTION OF RIGHT ANKLE: Primary | ICD-10-CM

## 2024-09-09 PROCEDURE — 97110 THERAPEUTIC EXERCISES: CPT | Mod: PN,CQ

## 2024-09-09 NOTE — PROGRESS NOTES
[facility]  Outpatient Physical Therapy Daily Treatment Note      Name: Cassy Vance  Clinic Number: [id number]  Visit Date: 9/9/2024    Therapy Diagnosis:   Encounter Diagnoses   Name Primary?    Impaired range of motion of right ankle Yes    Impaired functional mobility and activity tolerance        Physician: Selena Peters, NP  Physician Orders: PT Eval and Treat   Medical Diagnosis from Referral: S92.011D (ICD-10-CM) - Closed displaced fracture of body of right calcaneus with routine healing, subsequent   Evaluation Date: 8/1/2024  Authorization Period Expiration: 8/2/25  Plan of Care Expiration: 10/15/24  Visit # / Visits authorized: 10/ 12   (POC 10/12)   FOTO Due visit 5  FOTO Due visit 10     Precautions: Weightbearing 100% can wean boot     FOTO Due Visit 5 -  Follow up  FOTO Due Visit 10 - Follow up    Time In: 116  Time Out: 200  Total Billable Timed: 44 minutes  Total Billable Un-timed: 0 minutes      Subjective     The patient presents to therapy: walking with the Rolling Walker and without boot.   Response to previous treatment: was good   Functional change: using Rolling Walker, out of boot     Pain: 0/10  Location: right ankles     Objective       Cassy received therapeutic exercises to develop strength, endurance, ROM, flexibility, posture, and core stabilization for 45 minutes including:    Alter G: XXL  50% x 10 min     Precor Hip Abduction 70# x 30   Precor Hip ADD 70# x 30   Precor Knee flexion 50# x 30   Precor Knee extension 40# x 30   Precor leg press 20# x 30       Patient Education and HEP     She was compliant with home exercise program.    Education provided:   - do hep, slowly try to wean from boot    Written Home Exercises Provided: Patient instructed to cont prior HEP.  Exercises were reviewed and Cassy was able to demonstrate them prior to the end of the session.  Cassy demonstrated fair  understanding of the education provided.     See EMR  under Patient Instructions for exercises provided prior visit.    Assessment     Antigravity treadmill used during session today to increase tolerance on Right Lower Extremity and improve range of motion along with overall mobility. Challenged appropriately, making stance time improvements and able to walk with Rolling Walker without boot. Progress as tolerated.     Pt will continue to benefit from skilled outpatient physical therapy to address the deficits listed in the problem list box on initial evaluation, provide pt/family education and to maximize pt's level of independence in the home and community environment.     Cassy Is progressing well towards her goals.   Pt prognosis is Good.     Pt's spiritual, cultural and educational needs considered and pt agreeable to plan of care and goals.    Anticipated barriers to physical therapy: none    Goals:   SHORT TERM GOALS:  3 weeks  Progress Date met    The patient will begin a written HEP [] Met  [] Not Met  [x] Progressing      Increase ankle dorsiflexion to 3 degrees [] Met  [] Not Met  [x] Progressing     Decrease soft tissue tenderness to 2/10 [] Met  [] Not Met  [x] Progressing                      LONG TERM GOALS: 6 weeks  Progress Date met    The patient will be independent with his HEP for maintenance [] Met  [] Not Met  [x] Progressing     Increase ankle dorsiflexion to 8 degrees [] Met  [] Not Met  [x] Progressing     Decrease FOTO score to 44 % [] Met  [] Not Met  [x] Progressing     Decrease soft tissue tenderness to 0/10 [] Met  [] Not Met  [x] Progressing     Walk without AD and return to work  [] Met  [x] Not Met        Plan     Continue with the plan of care established per initial evaluation    Anna Obrien, PTA

## 2024-09-16 ENCOUNTER — CLINICAL SUPPORT (OUTPATIENT)
Dept: REHABILITATION | Facility: HOSPITAL | Age: 44
End: 2024-09-16
Payer: MEDICAID

## 2024-09-16 DIAGNOSIS — Z74.09 IMPAIRED FUNCTIONAL MOBILITY AND ACTIVITY TOLERANCE: ICD-10-CM

## 2024-09-16 DIAGNOSIS — M25.671 IMPAIRED RANGE OF MOTION OF RIGHT ANKLE: Primary | ICD-10-CM

## 2024-09-16 PROCEDURE — 97110 THERAPEUTIC EXERCISES: CPT | Mod: PN

## 2024-09-16 NOTE — PROGRESS NOTES
[facility]  Outpatient Physical Therapy Daily Treatment Note      Name: Cassy Vance  Clinic Number: [id number]  Visit Date: 9/16/2024    Therapy Diagnosis:   Encounter Diagnoses   Name Primary?    Impaired range of motion of right ankle Yes    Impaired functional mobility and activity tolerance        Physician: Selena Peters, NP  Physician Orders: PT Eval and Treat   Medical Diagnosis from Referral: S92.011D (ICD-10-CM) - Closed displaced fracture of body of right calcaneus with routine healing, subsequent   Evaluation Date: 8/1/2024  Authorization Period Expiration: 8/2/25  Plan of Care Expiration: 10/15/24  Visit # / Visits authorized: 11/ 12   (POC 11/12)   FOTO Due visit 5  FOTO Due visit 10     Precautions: Weightbearing 100% can wean boot     FOTO Due Visit 5 -  Follow up  FOTO Due Visit 10 - Follow up    Time In: 110  Time Out: 200  Total Billable Timed: 50 minutes  Total Billable Un-timed: 0 minutes      Subjective     The patient presents to therapy: sore walked a lot over the weekend   Response to previous treatment: was good   Functional change: using Rolling Walker, out of boot     Pain: 0/10  Location: right ankles     Objective       Cassy received therapeutic exercises to develop strength, endurance, ROM, flexibility, posture, and core stabilization for 45 minutes including:    Alter G: XXL  60% x 10 min   0.7 mph     Precor Hip Abduction 70# x 30   Precor Hip ADD 70# x 30   Precor Knee flexion 50# x 30   Precor Knee extension 40# x 30   Precor leg press 20# x 30       Patient Education and HEP     She was compliant with home exercise program.    Education provided:   - do hep, slowly try to wean from boot    Written Home Exercises Provided: Patient instructed to cont prior HEP.  Exercises were reviewed and Csasy was able to demonstrate them prior to the end of the session.  Cassy demonstrated fair  understanding of the education provided.     See EMR under  Patient Instructions for exercises provided prior visit.    Assessment     Able to walk with less pain at 60% on treadmill today. Continue working on her motion and strength . Progress as tolerated.     Pt will continue to benefit from skilled outpatient physical therapy to address the deficits listed in the problem list box on initial evaluation, provide pt/family education and to maximize pt's level of independence in the home and community environment.     Cassy Is progressing well towards her goals.   Pt prognosis is Good.     Pt's spiritual, cultural and educational needs considered and pt agreeable to plan of care and goals.    Anticipated barriers to physical therapy: none    Goals:   SHORT TERM GOALS:  3 weeks  Progress Date met    The patient will begin a written HEP [] Met  [] Not Met  [x] Progressing      Increase ankle dorsiflexion to 3 degrees [] Met  [] Not Met  [x] Progressing     Decrease soft tissue tenderness to 2/10 [] Met  [] Not Met  [x] Progressing                      LONG TERM GOALS: 6 weeks  Progress Date met    The patient will be independent with his HEP for maintenance [] Met  [] Not Met  [x] Progressing     Increase ankle dorsiflexion to 8 degrees [] Met  [] Not Met  [x] Progressing     Decrease FOTO score to 44 % [] Met  [] Not Met  [x] Progressing     Decrease soft tissue tenderness to 0/10 [] Met  [] Not Met  [x] Progressing     Walk without AD and return to work  [] Met  [x] Not Met        Plan     Continue with the plan of care established per initial evaluation    Guillaume Acosta, PT

## 2024-09-23 ENCOUNTER — CLINICAL SUPPORT (OUTPATIENT)
Dept: REHABILITATION | Facility: HOSPITAL | Age: 44
End: 2024-09-23
Payer: MEDICAID

## 2024-09-23 DIAGNOSIS — Z74.09 IMPAIRED FUNCTIONAL MOBILITY AND ACTIVITY TOLERANCE: ICD-10-CM

## 2024-09-23 DIAGNOSIS — M25.671 IMPAIRED RANGE OF MOTION OF RIGHT ANKLE: Primary | ICD-10-CM

## 2024-09-23 PROCEDURE — 97110 THERAPEUTIC EXERCISES: CPT | Mod: PN

## 2024-09-23 NOTE — PROGRESS NOTES
[facility]  Outpatient Physical Therapy Daily Treatment Note      Name: Cassy Ulloa  Clinic Number: [id number]  Visit Date: 9/23/2024    Therapy Diagnosis:   Encounter Diagnoses   Name Primary?    Impaired range of motion of right ankle Yes    Impaired functional mobility and activity tolerance        Physician: Selena Peters, NP  Physician Orders: PT Eval and Treat   Medical Diagnosis from Referral: S92.011D (ICD-10-CM) - Closed displaced fracture of body of right calcaneus with routine healing, subsequent   Evaluation Date: 8/1/2024  Authorization Period Expiration: 8/2/25  Plan of Care Expiration: 10/15/24  Visit # / Visits authorized: 11/ 12   (POC 11/12)   FOTO Due visit 5  FOTO Due visit 10     Precautions: Weightbearing 100% can wean boot     FOTO Due Visit 5 -  Follow up  FOTO Due Visit 10 - Follow up    Time In: 110  Time Out: 200  Total Billable Timed: 50 minutes  Total Billable Un-timed: 0 minutes      Subjective     The patient presents to therapy: feels about the same, still has pain and difficulty weight bearing   Response to previous treatment: was good   Functional change: using Rolling Walker, out of boot     Pain: 0/10  Location: right ankles     Objective       Cassy received therapeutic exercises to develop strength, endurance, ROM, flexibility, posture, and core stabilization for 45 minutes including:    Alter G: XXL  75% x 10 min   0.8 mph     Precor Hip Abduction 70# x 30   Precor Hip ADD 60# x 30   Precor Knee flexion 50# x 30   Precor Knee extension 40# x 30   Precor leg press 20# x 30     DorsiFlexion Stretch on Step  GS Slant board   HR/TR       Patient Education and HEP     She was compliant with home exercise program.    Education provided:   - do hep, slowly try to wean from boot    Written Home Exercises Provided: Patient instructed to cont prior HEP.  Exercises were reviewed and Cassy was able to demonstrate them prior to the end of the  session.  Cassy demonstrated fair  understanding of the education provided.     See EMR under Patient Instructions for exercises provided prior visit.    Assessment     Able to walk with less pain at 75% on treadmill today. Working on calf and achilles flexibility , can walk better after stretching. Progress as tolerated.     Pt will continue to benefit from skilled outpatient physical therapy to address the deficits listed in the problem list box on initial evaluation, provide pt/family education and to maximize pt's level of independence in the home and community environment.     Cassy Is progressing well towards her goals.   Pt prognosis is Good.     Pt's spiritual, cultural and educational needs considered and pt agreeable to plan of care and goals.    Anticipated barriers to physical therapy: none    Goals:   SHORT TERM GOALS:  3 weeks  Progress Date met    The patient will begin a written HEP [] Met  [] Not Met  [x] Progressing      Increase ankle dorsiflexion to 3 degrees [] Met  [] Not Met  [x] Progressing     Decrease soft tissue tenderness to 2/10 [] Met  [] Not Met  [x] Progressing                      LONG TERM GOALS: 6 weeks  Progress Date met    The patient will be independent with his HEP for maintenance [] Met  [] Not Met  [x] Progressing     Increase ankle dorsiflexion to 8 degrees [] Met  [] Not Met  [x] Progressing     Decrease FOTO score to 44 % [] Met  [] Not Met  [x] Progressing     Decrease soft tissue tenderness to 0/10 [] Met  [] Not Met  [x] Progressing     Walk without AD and return to work  [] Met  [x] Not Met        Plan     Continue with the plan of care established per initial evaluation    Guillaume Acosta, PT

## 2024-10-02 DIAGNOSIS — S92.011D CLOSED DISPLACED FRACTURE OF BODY OF RIGHT CALCANEUS WITH ROUTINE HEALING: Primary | ICD-10-CM

## 2024-10-02 DIAGNOSIS — S92.011D CLOSED DISPLACED FRACTURE OF BODY OF RIGHT CALCANEUS WITH ROUTINE HEALING, SUBSEQUENT ENCOUNTER: ICD-10-CM

## 2024-10-03 ENCOUNTER — CLINICAL SUPPORT (OUTPATIENT)
Dept: REHABILITATION | Facility: HOSPITAL | Age: 44
End: 2024-10-03
Payer: MEDICAID

## 2024-10-03 DIAGNOSIS — Z74.09 IMPAIRED FUNCTIONAL MOBILITY AND ACTIVITY TOLERANCE: ICD-10-CM

## 2024-10-03 DIAGNOSIS — M25.671 IMPAIRED RANGE OF MOTION OF RIGHT ANKLE: Primary | ICD-10-CM

## 2024-10-03 PROCEDURE — 97110 THERAPEUTIC EXERCISES: CPT | Mod: PN

## 2024-10-03 NOTE — PROGRESS NOTES
[facility]  Outpatient Physical Therapy Daily Treatment Note      Name: Cassy Ulloa  Clinic Number: [id number]  Visit Date: 10/3/2024    Therapy Diagnosis:   Encounter Diagnoses   Name Primary?    Impaired range of motion of right ankle Yes    Impaired functional mobility and activity tolerance        Physician: Selena Peters, NP  Physician Orders: PT Eval and Treat   Medical Diagnosis from Referral: S92.011D (ICD-10-CM) - Closed displaced fracture of body of right calcaneus with routine healing, subsequent   Evaluation Date: 8/1/2024  Authorization Period Expiration: 8/2/25  Plan of Care Expiration: 10/15/24  Visit # / Visits authorized: 12/ 12   (POC 12/12)   FOTO Due visit 5  FOTO Due visit 10     Precautions: Weightbearing 100%     FOTO Due Visit 5 -  Follow up  FOTO Due Visit 10 - Follow up    Time In: 100  Time Out: 150  Total Billable Timed: 50 minutes  Total Billable Un-timed: 0 minutes      Subjective     The patient presents to therapy: saw MD is getting a CT scan,  But did get some new more cushion shoes and is helping her pain with walking   Response to previous treatment: was good   Functional change: using Rolling Walker, out of boot     Pain: 0/10  Location: right ankles     Objective       Cassy received therapeutic exercises to develop strength, endurance, ROM, flexibility, posture, and core stabilization for 45 minutes including:    Alter G: XXL  80% x 15 min   0.8 mph     Precor Hip Abduction 70# x 30   Precor Hip ADD 60# x 30   Precor Knee flexion 50# x 30   Precor Knee extension 40# x 30   Precor leg press 20# x 30     DorsiFlexion Stretch on Step x 30   GS Slant board 3 x 30 sec   HR/TR x 30       Patient Education and HEP     She was compliant with home exercise program.    Education provided:   - do hep, slowly try to wean from boot    Written Home Exercises Provided: Patient instructed to cont prior HEP.  Exercises were reviewed and Cassy was able to  demonstrate them prior to the end of the session.  Cassy demonstrated fair  understanding of the education provided.     See EMR under Patient Instructions for exercises provided prior visit.    Assessment     Was walking better with new shoes,less pain.  Could walk short distance without AD ,  Lacks DorsiFlexion with gait  Progress as tolerated.     Pt will continue to benefit from skilled outpatient physical therapy to address the deficits listed in the problem list box on initial evaluation, provide pt/family education and to maximize pt's level of independence in the home and community environment.     Cassy Is progressing well towards her goals.   Pt prognosis is Good.     Pt's spiritual, cultural and educational needs considered and pt agreeable to plan of care and goals.    Anticipated barriers to physical therapy: none    Goals:   SHORT TERM GOALS:  3 weeks  Progress Date met    The patient will begin a written HEP [] Met  [] Not Met  [x] Progressing      Increase ankle dorsiflexion to 3 degrees [] Met  [] Not Met  [x] Progressing     Decrease soft tissue tenderness to 2/10 [] Met  [] Not Met  [x] Progressing                      LONG TERM GOALS: 6 weeks  Progress Date met    The patient will be independent with his HEP for maintenance [] Met  [] Not Met  [x] Progressing     Increase ankle dorsiflexion to 8 degrees [] Met  [] Not Met  [x] Progressing     Decrease FOTO score to 44 % [] Met  [] Not Met  [x] Progressing     Decrease soft tissue tenderness to 0/10 [] Met  [] Not Met  [x] Progressing     Walk without AD and return to work  [] Met  [x] Not Met        Plan     Continue with the plan of care established per initial evaluation    Guillaume Acosta, PT

## 2024-10-07 ENCOUNTER — HOSPITAL ENCOUNTER (OUTPATIENT)
Dept: RADIOLOGY | Facility: HOSPITAL | Age: 44
Discharge: HOME | End: 2024-10-07
Attending: NURSE PRACTITIONER
Payer: MEDICAID

## 2024-10-07 DIAGNOSIS — S92.011D CLOSED DISPLACED FRACTURE OF BODY OF RIGHT CALCANEUS WITH ROUTINE HEALING, SUBSEQUENT ENCOUNTER: ICD-10-CM

## 2024-10-07 PROCEDURE — 73700 CT LOWER EXTREMITY W/O DYE: CPT | Mod: 26,RT,, | Performed by: RADIOLOGY

## 2024-10-07 PROCEDURE — 73700 CT LOWER EXTREMITY W/O DYE: CPT | Mod: TC,PO,RT

## 2024-10-08 ENCOUNTER — CLINICAL SUPPORT (OUTPATIENT)
Dept: REHABILITATION | Facility: HOSPITAL | Age: 44
End: 2024-10-08
Payer: MEDICAID

## 2024-10-08 DIAGNOSIS — Z74.09 IMPAIRED FUNCTIONAL MOBILITY AND ACTIVITY TOLERANCE: ICD-10-CM

## 2024-10-08 DIAGNOSIS — M25.671 IMPAIRED RANGE OF MOTION OF RIGHT ANKLE: Primary | ICD-10-CM

## 2024-10-08 PROCEDURE — 97110 THERAPEUTIC EXERCISES: CPT | Mod: PN

## 2024-10-08 NOTE — PROGRESS NOTES
[facility]  Outpatient Physical Therapy Daily Treatment Note      Name: Cassy Vance  Clinic Number: [id number]  Visit Date: 10/8/2024    Therapy Diagnosis:   Encounter Diagnoses   Name Primary?    Impaired range of motion of right ankle Yes    Impaired functional mobility and activity tolerance        Physician: Selena Peters, NP  Physician Orders: PT Eval and Treat   Medical Diagnosis from Referral: S92.011D (ICD-10-CM) - Closed displaced fracture of body of right calcaneus with routine healing, subsequent   Evaluation Date: 8/1/2024  Authorization Period Expiration: 8/2/25  Plan of Care Expiration: 10/15/24  Visit # / Visits authorized: 13/ 21    FOTO Due visit 5  FOTO Due visit 10     Precautions: Weightbearing 100%     FOTO Due Visit 5 -  Follow up  FOTO Due Visit 10 - Follow up    Time In: 200  Time Out: 253  Total Billable Timed: 53 minutes  Total Billable Un-timed: 0 minutes      Subjective     The patient presents to therapy: Medial right knee pain today   Response to previous treatment: was good   Functional change: using Rolling Walker, out of boot     Pain: 0/10  Location: right ankles     Objective       Cassy received therapeutic exercises to develop strength, endurance, ROM, flexibility, posture, and core stabilization for 45 minutes including:    Alter G: XXL  80% x 15 min   0.8 mph     Precor Hip Abduction 70# x 30   Precor Hip ADD 60# x 30   Precor Knee flexion 50# x 30   Precor Knee extension 40# x 30   Precor leg press 20# x 30     DorsiFlexion Stretch on Step x 30   GS Slant board 3 x 30 sec   HR/TR x 30   Roller to right calf and achilles     Patient Education and HEP     She was compliant with home exercise program.    Education provided:   - do hep, slowly try to wean from boot    Written Home Exercises Provided: Patient instructed to cont prior HEP.  Exercises were reviewed and Cassy was able to demonstrate them prior to the end of the session.   Cassy demonstrated fair  understanding of the education provided.     See EMR under Patient Instructions for exercises provided prior visit.    Assessment     Was walking with right Lower Extremity External Rotation medial knee pain, Gastroc guarded felt better walked better after roller  Progress as tolerated.     Pt will continue to benefit from skilled outpatient physical therapy to address the deficits listed in the problem list box on initial evaluation, provide pt/family education and to maximize pt's level of independence in the home and community environment.     Cassy Is progressing well towards her goals.   Pt prognosis is Good.     Pt's spiritual, cultural and educational needs considered and pt agreeable to plan of care and goals.    Anticipated barriers to physical therapy: none    Goals:   SHORT TERM GOALS:  3 weeks  Progress Date met    The patient will begin a written HEP [] Met  [] Not Met  [x] Progressing      Increase ankle dorsiflexion to 3 degrees [] Met  [] Not Met  [x] Progressing     Decrease soft tissue tenderness to 2/10 [] Met  [] Not Met  [x] Progressing                      LONG TERM GOALS: 6 weeks  Progress Date met    The patient will be independent with his HEP for maintenance [] Met  [] Not Met  [x] Progressing     Increase ankle dorsiflexion to 8 degrees [] Met  [] Not Met  [x] Progressing     Decrease FOTO score to 44 % [] Met  [] Not Met  [x] Progressing     Decrease soft tissue tenderness to 0/10 [] Met  [] Not Met  [x] Progressing     Walk without AD and return to work  [] Met  [x] Not Met        Plan     Continue with the plan of care established per initial evaluation    Guillaume Acosta, PT